# Patient Record
Sex: FEMALE | URBAN - METROPOLITAN AREA
[De-identification: names, ages, dates, MRNs, and addresses within clinical notes are randomized per-mention and may not be internally consistent; named-entity substitution may affect disease eponyms.]

---

## 2018-07-26 ENCOUNTER — RECORDS - HEALTHEAST (OUTPATIENT)
Dept: LAB | Facility: CLINIC | Age: 38
End: 2018-07-26

## 2018-07-26 LAB
ALBUMIN SERPL-MCNC: 3.9 G/DL (ref 3.5–5)
ALP SERPL-CCNC: 80 U/L (ref 45–120)
ALT SERPL W P-5'-P-CCNC: 32 U/L (ref 0–45)
ANION GAP SERPL CALCULATED.3IONS-SCNC: 8 MMOL/L (ref 5–18)
AST SERPL W P-5'-P-CCNC: 30 U/L (ref 0–40)
BILIRUB SERPL-MCNC: 0.3 MG/DL (ref 0–1)
BUN SERPL-MCNC: 11 MG/DL (ref 8–22)
CALCIUM SERPL-MCNC: 9.1 MG/DL (ref 8.5–10.5)
CHLORIDE BLD-SCNC: 103 MMOL/L (ref 98–107)
CO2 SERPL-SCNC: 25 MMOL/L (ref 22–31)
CREAT SERPL-MCNC: 0.64 MG/DL (ref 0.6–1.1)
ERYTHROCYTE [DISTWIDTH] IN BLOOD BY AUTOMATED COUNT: 13.4 % (ref 11–14.5)
GFR SERPL CREATININE-BSD FRML MDRD: >60 ML/MIN/1.73M2
GLUCOSE BLD-MCNC: 72 MG/DL (ref 70–125)
HCT VFR BLD AUTO: 38.8 % (ref 35–47)
HGB BLD-MCNC: 13.4 G/DL (ref 12–16)
MCH RBC QN AUTO: 29.2 PG (ref 27–34)
MCHC RBC AUTO-ENTMCNC: 34.5 G/DL (ref 32–36)
MCV RBC AUTO: 85 FL (ref 80–100)
PLATELET # BLD AUTO: 274 THOU/UL (ref 140–440)
PMV BLD AUTO: 10 FL (ref 8.5–12.5)
POTASSIUM BLD-SCNC: 5 MMOL/L (ref 3.5–5)
PROT SERPL-MCNC: 7.4 G/DL (ref 6–8)
RBC # BLD AUTO: 4.59 MILL/UL (ref 3.8–5.4)
SODIUM SERPL-SCNC: 136 MMOL/L (ref 136–145)
TSH SERPL DL<=0.005 MIU/L-ACNC: 1.39 UIU/ML (ref 0.3–5)
VIT B12 SERPL-MCNC: 1265 PG/ML (ref 213–816)
WBC: 6.5 THOU/UL (ref 4–11)

## 2019-07-16 ENCOUNTER — HOSPITAL ENCOUNTER (OUTPATIENT)
Dept: LAB | Age: 39
Setting detail: SPECIMEN
Discharge: HOME | End: 2019-07-16

## 2019-07-17 LAB
HPV SOURCE: NORMAL
HUMAN PAPILLOMA VIRUS 16 DNA: NEGATIVE
HUMAN PAPILLOMA VIRUS 18 DNA: NEGATIVE
HUMAN PAPILLOMA VIRUS FINAL DIAGNOSIS: NORMAL
HUMAN PAPILLOMA VIRUS OTHER HR: NEGATIVE
SPECIMEN DESCRIPTION: NORMAL

## 2019-07-23 ENCOUNTER — RECORDS - HEALTHEAST (OUTPATIENT)
Dept: ADMINISTRATIVE | Facility: OTHER | Age: 39
End: 2019-07-23

## 2019-07-23 LAB
BKR LAB AP ABNORMAL BLEEDING: NO
BKR LAB AP BIRTH CONTROL/HORMONES: NORMAL
BKR LAB AP CERVICAL APPEARANCE: NORMAL
BKR LAB AP GYN ADEQUACY: NORMAL
BKR LAB AP GYN INTERPRETATION: NORMAL
BKR LAB AP HPV REFLEX: NORMAL
BKR LAB AP LMP: NORMAL
BKR LAB AP PATIENT STATUS: NO
BKR LAB AP PREVIOUS ABNORMAL: NORMAL
BKR LAB AP PREVIOUS NORMAL: 2016
HIGH RISK?: NO
PATH REPORT.COMMENTS IMP SPEC: NORMAL
RESULT FLAG (HE HISTORICAL CONVERSION): NORMAL

## 2020-01-14 ENCOUNTER — RECORDS - HEALTHEAST (OUTPATIENT)
Dept: LAB | Facility: CLINIC | Age: 40
End: 2020-01-14

## 2020-01-14 LAB
CLUE CELLS: NORMAL
TRICHOMONAS, WET PREP: NORMAL
YEAST, WET PREP: NORMAL

## 2020-01-15 LAB
C TRACH DNA SPEC QL PROBE+SIG AMP: NEGATIVE
N GONORRHOEA DNA SPEC QL NAA+PROBE: NEGATIVE

## 2023-01-30 ENCOUNTER — NURSE TRIAGE (OUTPATIENT)
Dept: NURSING | Facility: CLINIC | Age: 43
End: 2023-01-30

## 2023-01-30 ENCOUNTER — OFFICE VISIT (OUTPATIENT)
Dept: URGENT CARE | Facility: URGENT CARE | Age: 43
End: 2023-01-30
Payer: OTHER MISCELLANEOUS

## 2023-01-30 VITALS
SYSTOLIC BLOOD PRESSURE: 117 MMHG | OXYGEN SATURATION: 98 % | HEART RATE: 75 BPM | DIASTOLIC BLOOD PRESSURE: 78 MMHG | RESPIRATION RATE: 16 BRPM

## 2023-01-30 DIAGNOSIS — S39.012A STRAIN OF LUMBAR REGION, INITIAL ENCOUNTER: Primary | ICD-10-CM

## 2023-01-30 DIAGNOSIS — M62.830 BACK MUSCLE SPASM: ICD-10-CM

## 2023-01-30 PROCEDURE — 99203 OFFICE O/P NEW LOW 30 MIN: CPT | Performed by: PHYSICIAN ASSISTANT

## 2023-01-30 RX ORDER — NAPROXEN 500 MG/1
500 TABLET ORAL 2 TIMES DAILY WITH MEALS
Qty: 30 TABLET | Refills: 0 | Status: SHIPPED | OUTPATIENT
Start: 2023-01-30 | End: 2024-01-30

## 2023-01-30 RX ORDER — METHOCARBAMOL 500 MG/1
500 TABLET, FILM COATED ORAL 3 TIMES DAILY
Qty: 30 TABLET | Refills: 0 | Status: SHIPPED | OUTPATIENT
Start: 2023-01-30 | End: 2024-04-23

## 2023-01-30 NOTE — PROGRESS NOTES
Assessment & Plan     Strain of lumbar region, initial encounter    Patient has lower back strain from work comp injury  No xrays indicated  Naprosyn for low back pain and inflammation      - naproxen (NAPROSYN) 500 MG tablet; Take 1 tablet (500 mg) by mouth 2 times daily (with meals)    Back muscle spasm    Patient has tightness in back  Warm moist heat with ROM exercises  Start on robaxin for muscle tension    - methocarbamol (ROBAXIN) 500 MG tablet; Take 1 tablet (500 mg) by mouth 3 times daily    Review of external notes as documented elsewhere in note    At today's visit with Anya Sherman , we discussed results, diagnosis, medications and formulated a plan.  We also discussed red flags for immediate return to clinic/ER, as well as indications for follow up with PCP if not improved in 3 days. Patient understood and agreed to plan. Anya Sherman was discharged with stable vitals and has no further questions.       No follow-ups on file.    Joseph De Souza, [facility], PA-C  [facility] URGENT CARE [facility]    Steph Joyner is a 42 year old, presenting for the following health issues:  Back Injury (Pt hurt back at work today )      HPI   Review of Systems   Constitutional, HEENT, cardiovascular, pulmonary, gi and gu systems are negative, except as otherwise noted.      Objective    /78   Pulse 75   Resp 16   SpO2 98%   There is no height or weight on file to calculate BMI.  Physical Exam   GENERAL: healthy, alert and no distress  RESP: lungs clear to auscultation - no rales, rhonchi or wheezes  CV: regular rate and rhythm, normal S1 S2, no S3 or S4, no murmur, click or rub, no peripheral edema and peripheral pulses strong  ABDOMEN: soft, nontender, no hepatosplenomegaly, no masses and bowel sounds normal  MS: Positive for left side lower back tenderness and muscle tightness of left low back  SKIN: no suspicious lesions or rashes  NEURO: Normal strength and tone, mentation intact and  speech normal  PSYCH: mentation appears normal, affect normal/bright

## 2023-01-30 NOTE — TELEPHONE ENCOUNTER
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  At or about 7 AM began with mid to lower left back pain    Same level as umbilical cord but in back more to left side.  States pain is 9/10 at time of this call.  Hurts when walking or trying to sit down or to get out of sitting position  If siitting down with straight and not moving,  does not hurt    Pt did not apply ice   Has stayed at work all day today  Pt reported this to her employers    According to the protocol, patient should see a physician or HCP today.  Care advice given. Patient verbalizes understanding and agrees with plan of care.     Vanna Prater RN, Nurse Advisor 3:09 PM 1/30/2023  Reason for Disposition    Back pain from overuse (work, exercise, gardening) OR from twisting, lifting, or bending injury    SEVERE back pain (e.g., excruciating, unable to do any normal activities) and not improved after pain medicine and CARE ADVICE    Additional Information    Negative: Dangerous mechanism of injury (e.g., MVA, contact sports, trampoline, diving, fall > 10 feet or 3 meters)  (Exception: Back pain began > 1 hour after injury.)    Negative: Weakness (i.e., paralysis, loss of muscle strength) of the leg(s) or foot and sudden onset after back injury    Negative: Numbness (i.e., loss of sensation) of the leg(s) or foot and sudden onset after back injury    Negative: Major bleeding (actively dripping or spurting) that can't be stopped    Negative: Bullet, knife or other serious penetrating wound    Negative: Shock suspected (e.g., cold/pale/clammy skin, too weak to stand, low BP, rapid pulse)    Negative: Sounds like a life-threatening emergency to the triager    Negative: Back pain not from an injury    Negative: Passed out (i.e., fainted, collapsed and was not responding)    Negative: Shock suspected (e.g., cold/pale/clammy skin, too weak  to stand, low BP, rapid pulse)    Negative: Sounds like a life-threatening emergency to the triager    Negative: Major injury to the back (e.g., MVA, fall > 10 feet or 3 meters, penetrating injury, etc.)    Negative: Pain in the upper back over the ribs (rib cage) that radiates (travels) into the chest    Negative: Pain in the upper back over the ribs (rib cage) and worsened by coughing (or clearly increases with breathing)    Negative: Back pain during pregnancy    Negative: SEVERE back pain of sudden onset and age > 60 years    Negative: SEVERE abdominal pain (e.g., excruciating)    Negative: Abdominal pain and age > 60 years    Negative: Unable to urinate (or only a few drops) and bladder feels very full    Negative: Loss of bladder or bowel control (urine or bowel incontinence; wetting self, leaking stool) of new-onset    Negative: Numbness (loss of sensation) in groin or rectal area    Negative: Pain radiates into groin, scrotum    Negative: Blood in urine (red, pink, or tea-colored)    Negative: Vomiting and pain over lower ribs of back (i.e., flank - kidney area)    Negative: Weakness of a leg or foot (e.g., unable to bear weight, dragging foot)    Negative: Patient sounds very sick or weak to the triager    Negative: Fever > 100.4 F (38.0 C) and flank pain    Negative: Pain or burning with passing urine (urination)    Protocols used: BACK INJURY-A-OH, BACK PAIN-A-OH

## 2023-01-30 NOTE — LETTER
[facility] URGENT CARE [address] [address]  [phone]        2023    REPORT OF WORK ABILITY    PATIENT DATA  Employee Name: Anya Sherman        : 1980   (Not on file)  Work related injury: YES  Today's date: 2023  Date of injury: 2023     PROVIDER EVALUATION: Please fill in as needed.  Please give copy to employee for employer.  1. Diagnosis: Lumbosacral muscle strain  2. Treatment: ice, rest, ROM exercises, medications  3. Medication: naprosyn, robaxin  NOTE: When ordering a medication, [place] Rules require Work Comp or WC on prescriptions.  4. Return to work date: May return tomorrow with restrictions      RESTRICTIONS:   No lifting over 10 lbs, no bending or stooping, no pushing/pulling  Medical Examiner: Joseph De Souza, [facility], PA-C          Next appointment: 1 week.  Follow up with PCP in 1 week for recheck if needing ongoing restrictions    CC: Employer, Managed Care Plan/Payor, Patient

## 2023-02-03 ENCOUNTER — NURSE TRIAGE (OUTPATIENT)
Dept: NURSING | Facility: CLINIC | Age: 43
End: 2023-02-03
Payer: OTHER MISCELLANEOUS

## 2023-02-03 ENCOUNTER — OFFICE VISIT (OUTPATIENT)
Dept: URGENT CARE | Facility: URGENT CARE | Age: 43
End: 2023-02-03
Payer: OTHER MISCELLANEOUS

## 2023-02-03 VITALS
DIASTOLIC BLOOD PRESSURE: 62 MMHG | OXYGEN SATURATION: 100 % | SYSTOLIC BLOOD PRESSURE: 101 MMHG | HEART RATE: 57 BPM | TEMPERATURE: 98.3 F

## 2023-02-03 DIAGNOSIS — T78.40XA ALLERGIC REACTION, INITIAL ENCOUNTER: Primary | ICD-10-CM

## 2023-02-03 DIAGNOSIS — M54.50 BILATERAL LOW BACK PAIN WITHOUT SCIATICA, UNSPECIFIED CHRONICITY: ICD-10-CM

## 2023-02-03 PROCEDURE — 99214 OFFICE O/P EST MOD 30 MIN: CPT | Performed by: PHYSICIAN ASSISTANT

## 2023-02-03 RX ORDER — METHYLPREDNISOLONE 4 MG
TABLET, DOSE PACK ORAL
Qty: 21 TABLET | Refills: 0 | Status: SHIPPED | OUTPATIENT
Start: 2023-02-03 | End: 2024-04-23

## 2023-02-03 RX ORDER — CETIRIZINE HYDROCHLORIDE 10 MG/1
10 TABLET ORAL DAILY
Qty: 14 TABLET | Refills: 0 | Status: SHIPPED | OUTPATIENT
Start: 2023-02-03 | End: 2024-04-23

## 2023-02-03 RX ORDER — ACETAMINOPHEN 500 MG
500-1000 TABLET ORAL EVERY 6 HOURS PRN
Qty: 30 TABLET | Refills: 0 | Status: SHIPPED | OUTPATIENT
Start: 2023-02-03 | End: 2024-04-23

## 2023-02-03 NOTE — PROGRESS NOTES
Assessment & Plan     Allergic reaction, initial encounter    Patient was taking medrol and zyrtec for allergic reaction, hives    Medrol and zyrtec for itching and allergic reaction    - methylPREDNISolone (MEDROL DOSEPAK) 4 MG tablet therapy pack; Follow package instructions  - cetirizine (ZYRTEC) 10 MG tablet; Take 1 tablet (10 mg) by mouth daily    Bilateral low back pain without sciatica, unspecified chronicity    When your back pain is new, you may find that certain positions will ease your pain. Gently try each of the following positions until you find one that eases your pain. Once you find a position of comfort, use it as often as you like while you recover. Return to your daily routine as soon as possible.  Use walking to help relieve your discomfort. Try taking a short walk every 3 to 4 hours during the day. Walk for a few minutes inside your home or take longer walks outside, on a treadmill or at a mall. Slowly increase the amount of time you walk. Expect discomfort when you begin, but it should lessen as your back starts to recover.  Your back pain should improve over the first couple of weeks. As it improves, you should be able to return to your normal activities.    - methylPREDNISolone (MEDROL DOSEPAK) 4 MG tablet therapy pack; Follow package instructions  - acetaminophen (TYLENOL) 500 MG tablet; Take 1-2 tablets (500-1,000 mg) by mouth every 6 hours as needed for mild pain    Review of external notes as documented elsewhere in note       At today's visit with Anya Burnettezquez , we discussed results, diagnosis, medications and formulated a plan.  We also discussed red flags for immediate return to clinic/ER, as well as indications for follow up with PCP if not improved in 3 days. Patient understood and agreed to plan. Anya Sherman was discharged with stable vitals and has no further questions.       No follow-ups on file.    Joseph De Souza, [facility], PA-BRAULIO  [facility] URGENT CARE  MARY Joyner is a 42 year old, presenting for the following health issues:  Medication Reaction (Started yesterday. Started on her hands moved up the arms, on the feet and on the legs. She woke up this morning and it was on her face. It is very itchy. Clothes irritate it. Lines of light in her eye sight yesterday. Started taking Methocarbamol 500mg and Naproxen 500mg.  )      HPI   Review of Systems   Constitutional, HEENT, cardiovascular, pulmonary, gi and gu systems are negative, except as otherwise noted.      Objective    /62   Pulse 57   Temp 98.3  F (36.8  C) (Oral)   SpO2 100%   There is no height or weight on file to calculate BMI.  Physical Exam   GENERAL: healthy, alert and no distress  ABDOMEN: soft, nontender, no hepatosplenomegaly, no masses and bowel sounds normal  MS: Positive for lower back tenderness, tightness in lumbar  SKIN: generalized hives, itching  NEURO: Normal strength and tone, mentation intact and speech normal  PSYCH: mentation appears normal, affect normal/bright

## 2023-02-03 NOTE — TELEPHONE ENCOUNTER
The patient is complaining of rash on arm and chest that started one day ago On Thursday 02/02/23    Today the rash has spread to face, legs, and feet on 02/03/23    The patient complains of mild itching, but says she is not scratching    She thinks the rash is related to a new medication she started on Tuesday on  02/01/23    The patient does not have a primary care provider so she was advised to go to urgent care for an evaluation    Caller verbalized and understands directives         Additional Information    Negative: Difficulty breathing or wheezing    Negative: Hoarseness or cough that started soon after 1st dose of drug    Negative: Swollen tongue that started soon after first dose of drug    Negative: Fever and purple or blood-colored spots or dots    Negative: Too weak or sick to stand    Negative: Sounds like a life-threatening emergency to the triager    Negative: Rash is only on 1 part of the body (localized)    Negative: Taking new non-prescription (OTC) antihistamine, decongestant, ear drops, eye drops, or other OTC cough/cold medicine    Negative: Taking new prescription antihistamine, allergy medicine, asthma medicine, eye drops, ear drops or nose drops    Negative: Rash started more than 3 days after stopping new prescription medicine    Negative: Swollen tongue    Negative: Widespread hives and onset < 2 hours of exposure to 1st dose of drug    Negative: Patient sounds very sick or weak to the triager    Negative: Fever    Negative: Face or lip swelling    Negative: Purple or blood-colored spots or dots (no fever and sounds well to triager)    Negative: Joint pain or swelling    Negative: Bloody crusts on lips or in mouth    Negative: Large or small blisters on skin (i.e., fluid filled bubbles or sacs)    Negative: Pregnant    Negative: Rash beginning within 4 hours of a new prescription medication    Negative: Hives or itching    Negative: Patient wants to be seen    Negative: Taking new  prescription antibiotic (EXCEPTION: finished taking new prescription antibiotic)    Taking new prescription medicine (EXCEPTIONs: finished taking new prescription antibiotic OR questions about flushing from niacin)    Protocols used: RASH - WIDESPREAD ON DRUGS - DRUG [id number]

## 2024-04-23 ENCOUNTER — ANCILLARY PROCEDURE (OUTPATIENT)
Dept: GENERAL RADIOLOGY | Facility: CLINIC | Age: 44
End: 2024-04-23
Attending: INTERNAL MEDICINE
Payer: OTHER MISCELLANEOUS

## 2024-04-23 ENCOUNTER — OFFICE VISIT (OUTPATIENT)
Dept: FAMILY MEDICINE | Facility: CLINIC | Age: 44
End: 2024-04-23
Payer: OTHER MISCELLANEOUS

## 2024-04-23 VITALS
BODY MASS INDEX: 27.4 KG/M2 | DIASTOLIC BLOOD PRESSURE: 65 MMHG | HEART RATE: 70 BPM | HEIGHT: 59 IN | TEMPERATURE: 98.3 F | RESPIRATION RATE: 19 BRPM | SYSTOLIC BLOOD PRESSURE: 112 MMHG | WEIGHT: 135.9 LBS | OXYGEN SATURATION: 99 %

## 2024-04-23 DIAGNOSIS — G89.29 CHRONIC PAIN OF RIGHT ELBOW: Primary | ICD-10-CM

## 2024-04-23 DIAGNOSIS — M25.521 CHRONIC PAIN OF RIGHT ELBOW: Primary | ICD-10-CM

## 2024-04-23 DIAGNOSIS — M25.521 CHRONIC PAIN OF RIGHT ELBOW: ICD-10-CM

## 2024-04-23 DIAGNOSIS — G89.29 CHRONIC PAIN OF RIGHT ELBOW: ICD-10-CM

## 2024-04-23 DIAGNOSIS — M75.41 IMPINGEMENT SYNDROME, SHOULDER, RIGHT: ICD-10-CM

## 2024-04-23 PROCEDURE — 73070 X-RAY EXAM OF ELBOW: CPT | Mod: TC | Performed by: RADIOLOGY

## 2024-04-23 PROCEDURE — 99213 OFFICE O/P EST LOW 20 MIN: CPT | Performed by: INTERNAL MEDICINE

## 2024-04-23 ASSESSMENT — PAIN SCALES - GENERAL: PAINLEVEL: EXTREME PAIN (9)

## 2024-04-23 NOTE — LETTER
April 23, 2024      Anya Sherman  [address]  [facility] [address]        To Whom It May Concern:    Anya Sherman was seen in our clinic.  She was found to have fall related injury of right elbow and right shoulder that occurred around January 25, 2024.  She has been found to have medial epicondylitis of the elbow and right shoulder impingement syndrome.  She will be starting physical therapy.  Her work should be restricted to intermittent lifting to no more than 10 pounds.      Sincerely,        Gold Bush MD

## 2024-04-23 NOTE — COMMUNITY RESOURCES LIST (PATIENT PREFERRED LANGUAGE)
April 23, 2024           TU LISTA PERSONALIZADA DE SERVICIOS y PROGRAMAS           ÓN DE BENEFICIOS    ón de elegibilidad para beneficios      [facility] application assistance  [address] [place] [address] (Distancia: 2.1 millas)  Teléfono: [phone]  Idioma: Melanie Niocle: Andria      [facility] [place] [address] (Distancia: 2.1 millas)  Idioma: Melanie Nicole: Andria Talavera - Navigators  Teléfono: [phone]  Sitio web: https://www.[facility].org/about-us/assister-program/navigators/index.jsp  Idioma: Melanie Moreno: Clau 8:00 a. m. - 4:00 p. m. Mar 8:00 a. m. - 4:00 p. m. Mié 8:00 a. m. - 4:00 p. m. Jue 8:00 a. m. - 4:00 p. m.        ALIMENTARIA    beneficios nutricionales      [facility] [place] [address] (Distancia: 2.1 millas)  Idioma: Melanie Nicole: Andria      [facility] [place] [address] (Distancia: 2.1 millas)  Idioma: Melanie Nicole: Andria      [employer] [place] - SNAP (formerly food stamps) Screening and Application help  Teléfono: [phone]  Sitio web: https://www.TriptrottingCloud Nine Productionsions.org/programs/[place]-food-helpline/  Idioma: Melanie Moreno: Clau 10:00 a. m. - 5:00 p. m. Mar 10:00 a. m. - 5:00 p. m. Mié 10:00 a. m. - 5:00 p. m. Jue 10:00 a. m. - 5:00 p. m. Vie 10:00 a. m. - 5:00 p. m.  Erasmo Ayers  Accesibilidad: Ada accesible, Alojamiento para personas ciegas, Sordos o con problemas de audición, Servicios de traducción    de alimentos      [facility] - Food pantry  [address] [place] [address] (Distancia: 3.7 millas)  Teléfono: [phone]  Sitio web: http://www.saintolaf.org/  Idioma: Melanie Nicole: Andria   Accesibilidad: Ada accesible      Food Shelf and [employer]rift [employer] - Food pantry  [address] [place] [address] (Distancia: 2.9 millas)  Teléfono: [phone]  Sitio web: http://www.[facility].org/  Idioma: Eitan Newsome  Tarifa: [place]  Accesibilidad: Ada accesible      [employer] Food Shelf - [place] [employer] Food Shelf  Teléfono: [phone]  Sitio web: www.bountifulbasket[facility].org  Idioma: Eitan Newsome  Horas: Clau 9:00 a. m. - 3:30 p. m. Mar 9:00 a. m. - 6:30 p. m. Mié 9:00 a. m. - 3:30 p. m. Jue 9:00 a. m. - 12:30 p. m. Vie 9:00 a. m. - 12:30 p. m. Sáb 9:00 a.m. - 12:00 p.m.  Tarifa: [place]        Y DEDE    ón de casos de vivienda      Living - Housing Support-[facility] (HWS-I)  [address] [place] [address] (Distancia: 4.3 millas)  Teléfono: [phone]  Sitio web: https://www.[employer]era Networks  Idioma: Melanie Williamson Somalí  Tarprema: Radhauro      [facility] - Housing With Services Independent  [address] [place] [address] (Distancia: 1.9 millas)  Teléfono: [phone]  Sitio web: https://www.[facility]ck[facility]ipp[facility].net/contact  Idioma: Eitan Newsome  Accesibilidad: Ada accesible, Alojamiento para personas ciegas, Sordos o con problemas de audición, Servicios de traducción      [employer]ousing [employer] - Housing Stabilization Services  Teléfono: [phone]  Sitio web: https://"[employer] Networks/  Idioma: Inglés  Horas: Clau 8:00 a. m. - 4:00 p. m. Mar 8:00 a. m. - 4:00 p. m. Mié 8:00 a. m. - 4:00 p. m. Jue 8:00 a. m. - 4:00 p. m. Vie 8:00 a. m. - 4:00 p. m.  Tarifa: [place]  Accesibilidad: Alojamiento para ciegos, sordos o con problemas de audición  Opciones de transporte: Transporte gratuito    para el pago de alquiler/hipoteca      MPLS - Emergency Rental Assistance (ERA)  [address] [place] [address] (Distancia: 4.0 millas)  Teléfono: [phone]  Correo electrónico: [email or web address]  Sitio web: https://www.[facility] Center[facility].org/erlinda  Idioma:  Melanie Nicole: Andria [facility] Assistance  [address] [place] [address] (Distancia: 3.4 millas)  Idioma: Melanie Nicole: Andria  Accesibilidad: Sordo o con problemas de audición, Ada accesible      30-Days Foundation  Keep the Key  Teléfono: [phone]  Sitio web: https://www.wis36-gvzxjwrhikzinp.org/programs.html  Idioma: Melanie  Horas: Clau 7:00 a. m. - 7:00 p. m. Mar 7:00 a. m. - 7:00 p. m. Mié 7:00 a. m. - 7:00 p. m. Jue 7:00 a. m. - 7:00 p. m. Vie 7:00 a. m. - 7:00 p. m.  Tarifa: Andria gallardo de Vivienda y Prevención de Desalojos      Living - Housing Support-[facility] (HWS-I)  [address] [place] [address] (Distancia: 4.3 millas)  Teléfono: [phone]  Sitio web: https://www.[employer]  Idioma: Melanie Williamson Somalí Tarifa: Seguro      Resource Connections - [place] [address] (Distancia: 3.7 millas)  Teléfono: [phone]  Sitio web: https://May[employer] PAC/housingcourtinfo  Idioma: Melanie Nicole: Andria      Line - Tenant Rights / Eviction Prevention  Sitio web: https://GogoCoin.org/l-olbq-wz-/  Idioma: Eitan Newsome               NÚMEROS Y SITIOS WEB IMPORTANTES        Servicios de emergencia  911  .   La vía unida  211 http://211unitedway.org  .   Control de veneno  (642) 155-5977 http://mnpoison.org http://wisconsinpoison.org  .     Salvavidas para el suicidio y las crisis  988http://988lifeline.org  .   Línea directa nacional de abuso infantil Childhelp  660.893.3117 http://Childhelphotline.org   .   Línea directa nacional de agresión sexual  (848) 258-3890 (MACARIO) http://Rainn.org   .     Línea Nacional de Seguridad para Fugitivos  ([phone] 867-8640 (FUGA) http://[facility].org  .   Apoyo mehdi el embarazo y el posparto  Llame o envíe un mensaje de texto al [phone] [place]: http://ppsupportmn.org [place]: http://OneTrueFan.com/wi  .   Línea de ayuda nacional para el abuso de sustancias  ([facility])  800-622-HELP (3021) http://Findtreatment.gov   .                DESCARGO DE RESPONSABILIDAD: Estos recursos se rincon generado a través de la plataforma [employer]. [employer] no respalda a ningún proveedor de servicios mencionado en esta lista de recursos. [employer] no garantiza que los servicios mencionados en esta lista de recursos estén disponibles para usted o mejoren osborn sandra o bienestar.    [facility]

## 2024-04-23 NOTE — PATIENT INSTRUCTIONS
"Patient Education   Tennis Elbow: Care Instructions  Overview     Tennis elbow is soreness or pain on the outer part of the elbow. The pain occurs when the tendon is stretched and becomes irritated by repeated twisting of the hand, wrist, and forearm. A tendon is a tough tissue that connects muscle to bone. This injury is common in tennis players. But you also can get it from many activities that work the same muscles. Examples include gardening, painting, and using tools.  Tennis elbow usually heals with rest and treatment at home.  Follow-up care is a key part of your treatment and safety. Be sure to make and go to all appointments, and call your doctor if you are having problems. It's also a good idea to know your test results and keep a list of the medicines you take.  How can you care for yourself at home?    Rest your fingers, wrist, and forearm. Try to stop or reduce any activity that causes elbow pain. You may have to rest your arm for weeks to months. Follow your doctor's directions for how long to rest.     Put ice or a cold pack on your elbow for 10 to 20 minutes at a time. Try to do this every 1 to 2 hours for the next 3 days (when you are awake) or until the swelling goes down. Put a thin cloth between the ice and your skin. You can try heat, or alternating heat and ice, after the first 3 days.     If your doctor gave you a brace or splint, use it as directed. A \"counterforce\" brace is a strap around your forearm, just below your elbow. It may ease the pressure on the tendon and spread force throughout your arm.     Prop up your elbow on pillows to help reduce swelling.     Follow your doctor's or physical therapist's directions for exercise.     Return to your usual activities slowly.     Try to prevent the problem. Learn the best techniques for your sport. For example, make sure the  on your tennis racquet is not too big for your hand. Try not to hit a tennis ball late in your swing.     If you " "work, consider asking your employer about new ways of doing your job if your elbow pain is caused by something you do at work.   Medicines    Be safe with medicines. Read and follow all instructions on the label.  If the doctor gave you a prescription medicine for pain, take it as prescribed.  If you are not taking a prescription pain medicine, ask your doctor if you can take an over-the-counter medicine.   When should you call for help?   Call your doctor now or seek immediate medical care if:    Your pain is worse.     You cannot bend your elbow normally.     Your arm or hand is cool or pale or changes color.     You have tingling, weakness, or numbness in your hand and fingers.   Watch closely for changes in your health, and be sure to contact your doctor if:    You have work problems caused by your elbow pain.     Your pain is not better after 2 weeks.   Where can you learn more?  Go to https://www.PeptiVir.net/patiented  Enter C285 in the search box to learn more about \"Tennis Elbow: Care Instructions.\"  Current as of: July 17, 2023               Content Version: 14.0    4540-5299 [employer].   Care instructions adapted under license by your healthcare professional. If you have questions about a medical condition or this instruction, always ask your healthcare professional. [employer] disclaims any warranty or liability for your use of this information.         "

## 2024-04-23 NOTE — PROGRESS NOTES
"  Assessment & Plan     1.  Chronic pain of the right elbow since work related injury around 1/25/2024.  X-ray of the right elbow negative today.  Clinically appears to have medial epicondylitis.  Advised nonsteroidal anti-inflammatory medication.  Diclofenac 50 mg twice a day prescribed and patient referred to physical therapy.  If she does not show improvement in the next 4 to 6 weeks we will refer her to sports medicine.  Restrict weight lifting to less than 10 pounds and that to intermittent.  2.  Impingement syndrome right shoulder suspected based on clinical exam.  Advised physical therapy for it as well.  Physical therapy referral placed.    BMI  Estimated body mass index is 27.92 kg/m  as calculated from the following:    Height as of this encounter: 1.486 m (4' 10.5\").    Weight as of this encounter: 61.6 kg (135 lb 14.4 oz).         Steph Joyner is a 43 year old, presenting for the following health issues:  Establish Care and Arm Pain (Ongoing right arm pain, due to a fall. )        4/23/2024    12:56 PM   Additional Questions   Roomed by Cyndie   Accompanied by son     History of Present Illness       Reason for visit:  Right arm inury  Symptom onset:  More than a month  Symptoms include:  Pain  Symptom intensity:  Moderate  Symptom progression:  Worsening  Had these symptoms before:  No  What makes it worse:  Lifting heavy objects  What makes it better:  Nothing    She eats 4 or more servings of fruits and vegetables daily.She consumes 0 sweetened beverage(s) daily.She exercises with enough effort to increase her heart rate 9 or less minutes per day.  She exercises with enough effort to increase her heart rate 3 or less days per week.   She is taking medications regularly.       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  She landed on her left knee and right outstretched hand.  She thereafter felt pain in the right elbow and also appears the pain is around the right " "shoulder as well.  The fall occurred around January 25, 2024.  Her symptoms have not improved and in fact they have gotten little worse.  The pain in the elbow is worst when she does lifting.  Her work requires lifting supplies and wet towels.  Intermittently she has to lift up to 20 pounds or more.  The right arm she feels feels little weak.  She has noticed that when she tries to reach back or raise her arm straight up that is when she has most pain.  Laying on the right side also hurts the shoulder.    The patient is accompanied by her son today.      Review of Systems  Constitutional, HEENT, cardiovascular, pulmonary, GI, , musculoskeletal, neuro, skin, endocrine and psych systems are negative, except as otherwise noted.      Objective    /65 (BP Location: Right arm, Patient Position: Sitting, Cuff Size: Adult Regular)   Pulse 70   Temp 98.3  F (36.8  C) (Temporal)   Resp 19   Ht 1.486 m (4' 10.5\")   Wt 61.6 kg (135 lb 14.4 oz)   LMP 03/13/2024 (Exact Date)   SpO2 99%   BMI 27.92 kg/m    Body mass index is 27.92 kg/m .  Physical Exam   GENERAL: alert and no distress  MS: Right upper extremity examination reveals no swelling.  Right elbow examination reveals good range of motion.  Flexion extension is normal.  There is localized tenderness over medial epicondyle.  Right shoulder examination reveals no swelling or redness.  Full abduction is limited by about 50 to 20 degrees because of pain.  Internal and external rotation also associate with some pain.    Xray - Reviewed and interpreted by me.  X-ray right elbow performed which looks normal.  I reviewed it with the patient.        Signed Electronically by: Gold Bush MD    "

## 2024-04-23 NOTE — COMMUNITY RESOURCES LIST (ENGLISH)
April 23, 2024           YOUR PERSONALIZED LIST OF SERVICES & PROGRAMS           NAVIGATION    Eligibility Screening      County - Disability application assistance  [address] [place] [address] (Distance: 2.1 miles)  Phone: [phone]  Language: English  Fee: Free      [facility] [place] [facility] - [facility]  [address] [place] [address] (Distance: 2.1 miles)  Language: English  Fee: Free      [employer] - Navigators  Phone: (521) [phone]  Website: https://www.mnsure.org/about-us/assister-program/navigators/index.jsp  Language: English  Hours: Mon 8:00 AM - 4:00 PM Tue 8:00 AM - 4:00 PM Wed 8:00 AM - 4:00 PM Thu 8:00 AM - 4:00 PM        ASSISTANCE    Nutrition Benefits      [facility] - [place] application assistance - [facility]  [address] [place] [address] (Distance: 2.1 miles)  Language: English  Fee: Free      [facility] - [place] application assistance - [facility]  [address] [place] [address] (Distance: 2.1 miles)  Language: English  Fee: Free      [employer] [place] - SNAP (formerly food stamps) Screening and Application help  Phone: (818) 162-9229  Website: https://www.hungersolutions.org/programs/[place]-food-helpline/  Language: English  Hours: Mon 10:00 AM - 5:00 PM Tue 10:00 AM - 5:00 PM Wed 10:00 AM - 5:00 PM Thu 10:00 AM - 5:00 PM Fri 10:00 AM - 5:00 PM  Fee: Free  Accessibility: [place] accessible, Blind accommodation, Deaf or hard of hearing, Translation services    Pantry      [facility] - Food pantry  [address] [place] [address] (Distance: 3.7 miles)  Phone: [phone]) [phone]  Website: http://www.saintolaf.org/  Language: English  Fee: Free  Accessibility: Ada accessible      [employer] - Food pantry  [address] [place] [address] (Distance: 2.9 miles)  Phone:  (335) [phone]  Website: http://www.[employer] Chant[employer]aceshLockr.org/  Language: English, Vietnamese  Fee: Free  Accessibility: Ada accessible      [employer] - [place] [employer] Shelf  Phone: (447) 992-9121  Website: www.[facility].[employer]  Language: English, Vietnamese  Hours: Mon 9:00 AM - 3:30 PM Tue 9:00 AM - 6:30 PM Wed 9:00 AM - 3:30 PM Thu 9:00 AM - 12:30 PM Fri 9:00 AM - 12:30 PM Sat 9:00 AM - 12:00 PM  Fee: Free        & SHELTER    Case Management      Living - Housing Support-Health system (HWS-I)  [address] [place] [address] (Distance: 4.3 miles)  Phone: [phone]) [phone]  Website: https://wwwDrexel Metals  Language: Icelandic, English, Turks and Caicos Islander  Fee: Insurance      [facility] [place] - Housing With Services Independent  [address] [place] [address] (Distance: 1.9 miles)  Phone: (536) 838-1576  Website: https://www.[employer]/contact  Language: English, Vietnamese  Accessibility: Ada accessible, Blind accommodation, Deaf or hard of hearing, Translation services      [employer] - Housing Stabilization Services  Phone: (359) 940-7688  Website: https://homebasemn.com/  Language: English  Hours: Mon 8:00 AM - 4:00 PM Tue 8:00 AM - 4:00 PM Wed 8:00 AM - 4:00 PM Thu 8:00 AM - 4:00 PM Fri 8:00 AM - 4:00 PM  Fee: Free  Accessibility: Blind accommodation, Deaf or hard of hearing  Transportation Options: Free transportation    Payment Assistance      [employer] - Emergency Rental Assistance (ERA)  [address] [place] [address] (Distance: 4.0 miles)  Phone: (386) 582-4106  Email: sana@[employer]  Website: https://www.[employer].org/era  Language: English  Fee: Free      [facility] - Rent Assistance  [address] [place] [address] (Distance: 3.4 miles)  Language: English  Fee: Free  Accessibility: Deaf or hard of hearing, Ada accessible      [employer] - [employer] Key  Phone: (795) 397-3955  Website:  https://www.emc09-kbvnmejvinfieo.org/programs.html  Language: English  Hours: Mon 7:00 AM - 7:00 PM Tue 7:00 AM - 7:00 PM Wed 7:00 AM - 7:00 PM Thu 7:00 AM - 7:00 PM Fri 7:00 AM - 7:00 PM  Fee: Free    Mediation & Eviction Prevention      Living - Housing Support-[facility] (HWS-I)  [address] [place] [address] (Distance: 4.3 miles)  Phone: (173) 536-6077  Website: https://[employer]  Language: Faroese, English, Trinidadian  Fee: Insurance      [employer] - Tenant [place] [address] (Distance: 3.7 miles)  Phone: (668) 830-7635  Website: https://Nutraspace/housingcourtinfo  Language: English  Fee: Free      Line - Tenant Rights / Eviction Prevention  Website: https://[facility].org/o-zonm-mi-/  Language: English, Montenegrin               IMPORTANT NUMBERS & WEBSITES        Emergency Services  911  .   United Way  211 http://211unitedway.org  .   Poison Control  (833) 904-4422 http://[place]poison.org http://wisconsinpoison.org  .     Suicide and Crisis Lifeline  988 http://988lifeline.org  .   Childhelp [place] Child Abuse Hotline  531.665.9145 http://Childhelphotline.org   .   National Sexual Assault Hotline  (222) 160-9764 (HOPE) http://Rainn.org   .     National Runaway Safeline  (433) 320-9628 (RUNAWAY) http://1800runaPhotoFix UK.org  .   Pregnancy & Postpartum Support  Call/text 896-071-9705  [place]: http://ppsupportmn.org  [place]: http://Silver[employer].com/wi  .   Substance Abuse National Helpline ([facility])  569-169-HELP (3262) http://Findtreatment.gov   .                DISCLAIMER: These resources have been generated via the [employer] Platform. [employer] does not endorse any service providers mentioned in this resource list. [employer] does not guarantee that the services mentioned in this resource list will be available to you or will improve your health or wellness.    [facility]

## 2024-04-29 ENCOUNTER — THERAPY VISIT (OUTPATIENT)
Dept: PHYSICAL THERAPY | Facility: CLINIC | Age: 44
End: 2024-04-29
Attending: INTERNAL MEDICINE
Payer: OTHER MISCELLANEOUS

## 2024-04-29 DIAGNOSIS — M25.521 CHRONIC PAIN OF RIGHT ELBOW: ICD-10-CM

## 2024-04-29 DIAGNOSIS — G89.29 CHRONIC PAIN OF RIGHT ELBOW: ICD-10-CM

## 2024-04-29 DIAGNOSIS — M75.41 IMPINGEMENT SYNDROME, SHOULDER, RIGHT: ICD-10-CM

## 2024-04-29 DIAGNOSIS — M25.521 RIGHT ELBOW PAIN: ICD-10-CM

## 2024-04-29 DIAGNOSIS — M25.511 SHOULDER PAIN, RIGHT: Primary | ICD-10-CM

## 2024-04-29 PROCEDURE — 97161 PT EVAL LOW COMPLEX 20 MIN: CPT | Mod: GP | Performed by: PHYSICAL THERAPIST

## 2024-04-29 PROCEDURE — 97110 THERAPEUTIC EXERCISES: CPT | Mod: GP | Performed by: PHYSICAL THERAPIST

## 2024-04-29 NOTE — PROGRESS NOTES
PHYSICAL THERAPY EVALUATION  Type of Visit: Evaluation  Patient presents to PT for treatment of right elbow and shoulder pain.  She reports a fall at work on 1/25/2024 with mild right elbow pain after.  Elbow pain has gradually worsened since, with lifting and cleaning tasks at work.  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  Right shoulder pain began a few weeks after elbow.  Patient complains of right shoulder and proximal UE pain which is worse with reaching overhead and behind back.  See electronic medical record for Abuse and Falls Screening details.    Subjective       Presenting condition or subjective complaint:    Date of onset:      Relevant medical history:     Dates & types of surgery:      Prior diagnostic imaging/testing results:       Prior therapy history for the same diagnosis, illness or injury:        Prior Level of Function  Transfers:   Ambulation:   ADL:   IADL:     Living Environment  Social support:     Type of home:     Stairs to enter the home:         Ramp:     Stairs inside the home:         Help at home:    Equipment owned:       Employment:      Hobbies/Interests:      Patient goals for therapy:      Pain assessment:      Objective   SHOULDER EVALUATION  PAIN: Pain Level with Use: 7/10  INTEGUMENTARY (edema, incisions): WNL  POSTURE:   GAIT:   Weightbearing Status:   Assistive Device(s):   Gait Deviations:   BALANCE/PROPRIOCEPTION:   WEIGHTBEARING ALIGNMENT: WNL  ROM:  WNL except reaching behind back is limited to posterior hip on right vs T5 on left    STRENGTH:   Pain: - none + mild ++ moderate +++ severe  Strength Scale: 0-5/5 Left Right   Shoulder Flexion 5 5   Shoulder Extension 5 5   Shoulder Abduction 5 4-  ++   Shoulder Adduction 5 5   Shoulder Internal Rotation 5 5   Shoulder External Rotation 5 4+   Shoulder Horizontal Abduction     Shoulder Horizontal Adduction     Elbow Flexion     Elbow Extension     Mid Trap      Lower Trap     Rhomboid     Serratus Anterior       FLEXIBILITY:   SPECIAL TESTS:    Left Right   Impingement     Neer's     Hawkin's-Shreyas  Positive   Coracoid Impingement     Internal impingement     Labral     Anterior Slide     [place]'s     Crank     Instability     Apprehension (anterior)     Relocation (anterior)     Anterior Load & Shift     Posterior Load & Shift     Posterior instability (with 90 degrees flex)     Multi-Directional Instability      Sulcus     Biceps      Speed's     Rotator Cuff Tear     Drop Arm     Belly Press     Lift off        PALPATION: WNL  JOINT MOBILITY: WNL  CERVICAL SCREEN: WNL      PAIN: Pain Location: elbow lateral and medial  INTEGUMENTARY (edema, incisions): WNL  POSTURE:   ROM:     STRENGTH:   Pain: - none + mild ++ moderate +++ severe  Strength Scale: 0-5/5 Left Right   Elbow Flexion 5 5  ++   Elbow Extension 5 4+ ++   Wrist Flexion     Wrist Extension     Supination 5 5   Pronation 5 5  +   Ulnar Deviation     Radial Deviation      Strength (lbs)     Lateral Pinch (lbs)     3 Point Pinch (lbs)          Hand Dominance: Right  FLEXIBILITY: WNL  SPECIAL TESTS:   PALPATION:  tender lateral and medial epicondyl  JOINT MOBILITY:   CERVICAL SCREEN:   THORACIC SCREEN:   SHOULDER SCREEN:     Assessment & Plan   CLINICAL IMPRESSIONS  Medical Diagnosis: right elbow pain, right shoulder impingement    Treatment Diagnosis:     Impression/Assessment: Patient is a 43 year old female with right elbow and shoulder complaints.  The following significant findings have been identified: Pain, Decreased ROM/flexibility, Decreased strength, and Decreased activity tolerance. These impairments interfere with their ability to perform self care tasks, work tasks, recreational activities, and household chores as compared to previous level of function.     Clinical Decision Making (Complexity):  Clinical Presentation: Stable/Uncomplicated  Clinical Presentation Rationale: based on medical  and personal factors listed in PT evaluation  Clinical Decision Making (Complexity): Low complexity    PLAN OF CARE  Treatment Interventions:  Interventions: Manual Therapy, Neuromuscular Re-education, Therapeutic Activity, Therapeutic Exercise    Long Term Goals     PT Goal 1  Goal Identifier: reaching  Goal Description: pt will be able to reach out to side and behind back without pain  Rationale: to maximize safety and independence with performance of ADLs and functional tasks  Goal Progress: reaching behind back limited to posterior hip and reaching out to side causes 6/20 right shoulder pain  Target Date: 06/24/24  PT Goal 2  Goal Identifier: lifting  Goal Description: lift grocery back without right elbow pain  Rationale: to maximize safety and independence with performance of ADLs and functional tasks  Goal Progress: right elbow pain 7/10 with lifging groceries  Target Date: 06/24/24      Frequency of Treatment: 1x/week  Duration of Treatment:      Recommended Referrals to Other Professionals:   Education Assessment:   Learner/Method: Patient;Family;Listening;Reading;Demonstration;Pictures/Video;No Barriers to Learning    Risks and benefits of evaluation/treatment have been explained.   Patient/Family/caregiver agrees with Plan of Care.     Evaluation Time:     PT Eval, Low Complexity Minutes (66344): 20       Signing Clinician: Carie Michaud PT

## 2024-05-10 ENCOUNTER — THERAPY VISIT (OUTPATIENT)
Dept: PHYSICAL THERAPY | Facility: CLINIC | Age: 44
End: 2024-05-10
Payer: OTHER MISCELLANEOUS

## 2024-05-10 DIAGNOSIS — M25.521 RIGHT ELBOW PAIN: Primary | ICD-10-CM

## 2024-05-10 PROCEDURE — 97110 THERAPEUTIC EXERCISES: CPT | Mod: GP | Performed by: PHYSICAL THERAPIST

## 2024-05-10 PROCEDURE — 97140 MANUAL THERAPY 1/> REGIONS: CPT | Mod: GP | Performed by: PHYSICAL THERAPIST

## 2024-05-20 ENCOUNTER — THERAPY VISIT (OUTPATIENT)
Dept: PHYSICAL THERAPY | Facility: CLINIC | Age: 44
End: 2024-05-20
Payer: OTHER MISCELLANEOUS

## 2024-05-20 DIAGNOSIS — M25.521 RIGHT ELBOW PAIN: ICD-10-CM

## 2024-05-20 DIAGNOSIS — M25.511 SHOULDER PAIN, RIGHT: Primary | ICD-10-CM

## 2024-05-20 PROCEDURE — 97035 APP MDLTY 1+ULTRASOUND EA 15: CPT | Mod: GP | Performed by: PHYSICAL THERAPIST

## 2024-05-20 PROCEDURE — 97140 MANUAL THERAPY 1/> REGIONS: CPT | Mod: GP | Performed by: PHYSICAL THERAPIST

## 2024-05-20 PROCEDURE — 97110 THERAPEUTIC EXERCISES: CPT | Mod: GP | Performed by: PHYSICAL THERAPIST

## 2024-06-17 ENCOUNTER — THERAPY VISIT (OUTPATIENT)
Dept: PHYSICAL THERAPY | Facility: CLINIC | Age: 44
End: 2024-06-17
Payer: OTHER MISCELLANEOUS

## 2024-06-17 DIAGNOSIS — M25.511 SHOULDER PAIN, RIGHT: Primary | ICD-10-CM

## 2024-06-17 DIAGNOSIS — M25.521 RIGHT ELBOW PAIN: ICD-10-CM

## 2024-06-17 PROCEDURE — T1013 SIGN LANG/ORAL INTERPRETER: HCPCS | Mod: U4 | Performed by: PHYSICAL THERAPIST

## 2024-06-17 PROCEDURE — 97140 MANUAL THERAPY 1/> REGIONS: CPT | Mod: GP | Performed by: PHYSICAL THERAPIST

## 2024-06-17 PROCEDURE — 97110 THERAPEUTIC EXERCISES: CPT | Mod: GP | Performed by: PHYSICAL THERAPIST

## 2024-06-17 PROCEDURE — 97035 APP MDLTY 1+ULTRASOUND EA 15: CPT | Mod: GP | Performed by: PHYSICAL THERAPIST

## 2024-06-24 ENCOUNTER — APPOINTMENT (OUTPATIENT)
Dept: INTERPRETER SERVICES | Facility: CLINIC | Age: 44
End: 2024-06-24
Payer: OTHER MISCELLANEOUS

## 2024-07-09 ENCOUNTER — THERAPY VISIT (OUTPATIENT)
Dept: PHYSICAL THERAPY | Facility: CLINIC | Age: 44
End: 2024-07-09
Payer: OTHER MISCELLANEOUS

## 2024-07-09 DIAGNOSIS — M25.511 SHOULDER PAIN, RIGHT: Primary | ICD-10-CM

## 2024-07-09 DIAGNOSIS — M25.521 RIGHT ELBOW PAIN: ICD-10-CM

## 2024-07-09 PROCEDURE — 97110 THERAPEUTIC EXERCISES: CPT | Mod: GP

## 2024-07-09 PROCEDURE — T1013 SIGN LANG/ORAL INTERPRETER: HCPCS | Mod: U4

## 2024-07-09 PROCEDURE — 97530 THERAPEUTIC ACTIVITIES: CPT | Mod: GP

## 2024-07-09 PROCEDURE — 97140 MANUAL THERAPY 1/> REGIONS: CPT | Mod: GP

## 2024-07-23 NOTE — PROGRESS NOTES
CHIEF COMPLAINT:  Pain of the Right Elbow     HISTORY OF PRESENT ILLNESS  Ms. Shay Sherman is a pleasant 44 year old year old female who presents to clinic today with right elbow pain.  Anya explains that she has had elbow since a work related injury on or around 1/25/2024.  She was initially seen on 4/23/2024 by her primary care provider, Dr. Bush.  At that time x-rays were negative.  She was diagnosed with medial epicondylitis as her pain resided mostly medially about the elbow.  She was prescribed diclofenac.      She had a fall, pain had been increasing and increasing since then.  She has done physical therapy for treatment.  Pain is laterally at the epicondyle as well as in the forearm.  She has been using her brace which helps, but she cannot use it while she is at work due to his limited mobility.  She notes that placing pressure with her other hand in a band around her upper forearm tends to help relieve her pain.  She is also tried Vaseline wrap which has been helpful.    Physical therapy over the past 5 visits have been modestly beneficial.  In discussion with Aicha her physical therapist, she recommends possibly considering OT and she did recently provide her with a brace 2 weeks ago    Onset: sudden  Location: right elbow lateral  Quality:  stabbing and sharp  Duration: 7 months   Severity: 10/10 at worst  Timing:constant  Modifying factors:  resting and non-use makes it better, movement and use makes it worse  When picking thing up, she feels pain in latera elbow, and sometimes in medial elbow.  Associated signs & symptoms: pain  Previous similar pain: No  Treatments to date: physical therapy, pain medication, but is not helping.    Additional history: as documented    Review of Systems:  A 10-point review of systems was obtained and is negative except for as noted in the HPI.       MEDICAL HISTORY  Patient Active Problem List   Diagnosis    Shoulder pain, right    Right elbow pain       Current  "Outpatient Medications   Medication Sig Dispense Refill    diclofenac (VOLTAREN) 50 MG EC tablet Take 1 tablet (50 mg) by mouth 2 times daily 60 tablet 1       Allergies   Allergen Reactions    Naprosyn [Naproxen] Hives     Patient was taking naprosyn and robaxin together, unsure which one cause the allergic reaction    Robaxin [Methocarbamol] Hives     Patient was taking naprosyn and robaxin together, unsure which one cause the allergic reaction       No family history on file.    Additional medical/Social/Surgical histories reviewed in [facility] and updated as appropriate.       PHYSICAL EXAM  /68   Ht 1.486 m (4' 10.5\")   Wt 61.2 kg (135 lb)   BMI 27.73 kg/m      General  - normal appearance, in no obvious distress  Musculoskeletal - Right elbow  - inspection: normal joint alignment, no obvious deformity, no soft tissue swelling laterally  - palpation: tender at the origin of the common extensor tendon.  Additional tenderness at the mid forearm extensor muscles near area of radial tunnel, nontender medial epicondyle or common flexor tendons.  - ROM: Full painless elbow range of motion  - strength: 5/5 wrist extension with elbow flexed, 4/5 with elbow extended, minimally painful resisted extension of long finger with elbow flexed, worse with extension, 5/5  strength  - special tests:  (-) varus  (-) valgus  Neuro  - no sensory or motor deficit, grossly normal coordination, normal muscle tone    IMAGING : X-ray elbow 4/23/24 final results and radiologist's interpretation, available in the [facility] health record. Images were reviewed with the patient/family members in the office today. My personal interpretation of the performed imaging is no acute osseous abnormalities, no effusion no loose body, no enthesopathy.     ASSESSMENT & PLAN  Ms. Shay Sherman is a 44 year old year old female who presents to clinic today with lateral greater than medial elbow and lateral forearm pain that seem to begin after a " work-related event on 1/25/2024.    Pain is been mildly improved, but largely recalcitrant to physical therapy, bracing, oral NSAIDs thus far.  Clinical examination and reassuring x-rays today most consistent with lateral epicondylitis of right elbow, additional lateral forearm pain may be related to muscular trigger point versus radial tunnel syndrome.    Diagnosis: Chronic pain of right forearm    Further diagnostic measures as well as treatment options discussed today.  After discussing personally with her physical therapist, Aicha, we agreed to refer her over to hand therapy as they may be more specialized to treat her condition.  I would like her to start using diclofenac gel which was prescribed to her pharmacy.  Continue using wrist brace as able, but because it felt better with pressure at the proximal forearm, I did fit her with a Cho-Pat strap today.  She can use this for work.  If no improvement noted over the next 6 to 8 weeks, would like to see her back to discuss next steps which could include advanced imaging such as MRI, sparing use of corticosteroid injection, or other.    It was a pleasure seeing Anya today.    Michael Holguin DO, CAQSM  Primary Care Sports Medicine

## 2024-07-25 ENCOUNTER — OFFICE VISIT (OUTPATIENT)
Dept: ORTHOPEDICS | Facility: CLINIC | Age: 44
End: 2024-07-25
Payer: OTHER MISCELLANEOUS

## 2024-07-25 VITALS
DIASTOLIC BLOOD PRESSURE: 68 MMHG | BODY MASS INDEX: 27.21 KG/M2 | SYSTOLIC BLOOD PRESSURE: 122 MMHG | HEIGHT: 59 IN | WEIGHT: 135 LBS

## 2024-07-25 DIAGNOSIS — M79.631 PAIN OF RIGHT FOREARM: Primary | ICD-10-CM

## 2024-07-25 PROCEDURE — 99214 OFFICE O/P EST MOD 30 MIN: CPT | Performed by: FAMILY MEDICINE

## 2024-07-25 NOTE — PATIENT INSTRUCTIONS
Thank you for choosing [facility]    DR [facility]REVI[facility] CLINIC LOCATIONS  [place] [address] [facility] [address], [address]th [place]   [place], [place], [address] [place] [address]   [phone]       APPOINTMENTS: [phone]    CARE QUESTIONS: [phone],    BILLING QUESTIONS: [phone]    FAX NUMBER: [phone]              1. Pain of right forearm          Physical Therapy orders have been placed with [facility] (formally [facility])  You can call [phone] to schedule at your convenience.

## 2024-07-25 NOTE — LETTER
7/25/2024      Anya Sherman  [address]  [place] [address]      Dear Colleague,    Thank you for referring your patient, Anya Sherman, to the [facility] [place]. Please see a copy of my visit note below.    CHIEF COMPLAINT:  Pain of the Right Elbow     HISTORY OF PRESENT ILLNESS  Ms. Shay Sherman is a pleasant 44 year old year old female who presents to clinic today with right elbow pain.  Anya explains that she has had elbow since a work related injury on or around 1/25/2024.  She was initially seen on 4/23/2024 by her primary care provider, Dr. Bush.  At that time x-rays were negative.  She was diagnosed with medial epicondylitis as her pain resided mostly medially about the elbow.  She was prescribed diclofenac.      She had a fall, pain had been increasing and increasing since then.  She has done physical therapy for treatment.  Pain is laterally at the epicondyle as well as in the forearm.  She has been using her brace which helps, but she cannot use it while she is at work due to his limited mobility.  She notes that placing pressure with her other hand in a band around her upper forearm tends to help relieve her pain.  She is also tried Vaseline wrap which has been helpful.    Physical therapy over the past 5 visits have been modestly beneficial.  In discussion with Aicha her physical therapist, she recommends possibly considering OT and she did recently provide her with a brace 2 weeks ago    Onset: sudden  Location: right elbow lateral  Quality:  stabbing and sharp  Duration: 7 months   Severity: 10/10 at worst  Timing:constant  Modifying factors:  resting and non-use makes it better, movement and use makes it worse  When picking thing up, she feels pain in latera elbow, and sometimes in medial elbow.  Associated signs & symptoms: pain  Previous similar pain: No  Treatments to date: physical therapy, pain medication, but is not helping.    Additional  "history: as documented    Review of Systems:  A 10-point review of systems was obtained and is negative except for as noted in the HPI.       MEDICAL HISTORY  Patient Active Problem List   Diagnosis     Shoulder pain, right     Right elbow pain       Current Outpatient Medications   Medication Sig Dispense Refill     diclofenac (VOLTAREN) 50 MG EC tablet Take 1 tablet (50 mg) by mouth 2 times daily 60 tablet 1       Allergies   Allergen Reactions     Naprosyn [Naproxen] Hives     Patient was taking naprosyn and robaxin together, unsure which one cause the allergic reaction     Robaxin [Methocarbamol] Hives     Patient was taking naprosyn and robaxin together, unsure which one cause the allergic reaction       No family history on file.    Additional medical/Social/Surgical histories reviewed in [facility] and updated as appropriate.       PHYSICAL EXAM  /68   Ht 1.486 m (4' 10.5\")   Wt 61.2 kg (135 lb)   BMI 27.73 kg/m      General  - normal appearance, in no obvious distress  Musculoskeletal - Right elbow  - inspection: normal joint alignment, no obvious deformity, no soft tissue swelling laterally  - palpation: tender at the origin of the common extensor tendon.  Additional tenderness at the mid forearm extensor muscles near area of radial tunnel, nontender medial epicondyle or common flexor tendons.  - ROM: Full painless elbow range of motion  - strength: 5/5 wrist extension with elbow flexed, 4/5 with elbow extended, minimally painful resisted extension of long finger with elbow flexed, worse with extension, 5/5  strength  - special tests:  (-) varus  (-) valgus  Neuro  - no sensory or motor deficit, grossly normal coordination, normal muscle tone    IMAGING : X-ray elbow 4/23/24 final results and radiologist's interpretation, available in the [facility] health record. Images were reviewed with the patient/family members in the office today. My personal interpretation of the performed imaging is no acute " osseous abnormalities, no effusion no loose body, no enthesopathy.     ASSESSMENT & PLAN  Ms. Shay Sherman is a 44 year old year old female who presents to clinic today with lateral greater than medial elbow and lateral forearm pain that seem to begin after a work-related event on 1/25/2024.    Pain is been mildly improved, but largely recalcitrant to physical therapy, bracing, oral NSAIDs thus far.  Clinical examination and reassuring x-rays today most consistent with lateral epicondylitis of right elbow, additional lateral forearm pain may be related to muscular trigger point versus radial tunnel syndrome.    Diagnosis: Chronic pain of right forearm    Further diagnostic measures as well as treatment options discussed today.  After discussing personally with her physical therapist, Aicha, we agreed to refer her over to hand therapy as they may be more specialized to treat her condition.  I would like her to start using diclofenac gel which was prescribed to her pharmacy.  Continue using wrist brace as able, but because it felt better with pressure at the proximal forearm, I did fit her with a Cho-Pat strap today.  She can use this for work.  If no improvement noted over the next 6 to 8 weeks, would like to see her back to discuss next steps which could include advanced imaging such as MRI, sparing use of corticosteroid injection, or other.    It was a pleasure seeing Anya today.    Michael Holguin DO, [facility]  Primary Care Sports Medicine       Again, thank you for allowing me to participate in the care of your patient.        Sincerely,        Michael Holguin DO

## 2024-07-26 ENCOUNTER — APPOINTMENT (OUTPATIENT)
Dept: INTERPRETER SERVICES | Facility: CLINIC | Age: 44
End: 2024-07-26
Payer: OTHER MISCELLANEOUS

## 2024-07-31 ENCOUNTER — THERAPY VISIT (OUTPATIENT)
Dept: PHYSICAL THERAPY | Facility: CLINIC | Age: 44
End: 2024-07-31
Payer: OTHER MISCELLANEOUS

## 2024-07-31 DIAGNOSIS — M25.511 SHOULDER PAIN, RIGHT: Primary | ICD-10-CM

## 2024-07-31 DIAGNOSIS — M25.521 RIGHT ELBOW PAIN: ICD-10-CM

## 2024-07-31 PROCEDURE — 97530 THERAPEUTIC ACTIVITIES: CPT | Mod: GP

## 2024-07-31 PROCEDURE — 97110 THERAPEUTIC EXERCISES: CPT | Mod: GP

## 2024-08-01 ENCOUNTER — TELEPHONE (OUTPATIENT)
Dept: ORTHOPEDICS | Facility: CLINIC | Age: 44
End: 2024-08-01
Payer: OTHER MISCELLANEOUS

## 2024-08-01 NOTE — TELEPHONE ENCOUNTER
Patient was last seen in clinic by Dr Holguin on 7/25/24 for right forearm pain. Patient started PT for right shoulder and right elbow pain on 4/29/24 and has attended 6 sessions.     Please advise if a letter can be written for the patient to extend PT visits for longer.     Nuzhat Olmos, INESSA, LAT, ATC  Certified Athletic Trainer

## 2024-08-01 NOTE — TELEPHONE ENCOUNTER
[facility] Call Center    Phone Message    May a detailed message be left on voicemail: yes     Reason for Call: Patient asking for Letter to be sent to Her Worker Comp about Extending Her PT. Please call Patient for Details    Action Taken: Message routed to:  [facility] matty    Travel Screening: Not Applicable     Date of Service:

## 2024-08-02 NOTE — TELEPHONE ENCOUNTER
Called patient using  services. Informed her that letter has been completed and inquired about where she would like it sent. She would like it sent to work comp. She stated she does not know the fax number of where to send it. Writer requested she contact her work comp to obtain this information and to call the office back with this information.     She will call back with work comp fax number. She also requests this be mailed to her. Address on file confirmed as accurate mailing address. Letter placed in outgoing mail from  office.    She was provided the call back number and will call us with the fax number for her work comp.     Tracy Goel ATC

## 2024-08-02 NOTE — TELEPHONE ENCOUNTER
I placed a letter in her chart.  She is not on MyChart so she will not receive it.  Please ask her how she would like to obtain the letter.

## 2024-08-06 NOTE — TELEPHONE ENCOUNTER
Other: Pt is calling back with fax number for work comp. Please fax to fax# [phone]     Could we send this information to you in [employer]Cut or would you prefer to receive a phone call?:   Patient would prefer a phone call   Okay to leave a detailed message?: Yes at Cell number on file:    Telephone Information:   Mobile [phone]

## 2024-08-06 NOTE — TELEPHONE ENCOUNTER
Letter was printed off and faxed to the number listed below.     Nuzhat Olmos, INESSA, LAT, ATC  Certified Athletic Trainer

## 2024-08-13 ENCOUNTER — TELEPHONE (OUTPATIENT)
Dept: ORTHOPEDICS | Facility: CLINIC | Age: 44
End: 2024-08-13
Payer: OTHER MISCELLANEOUS

## 2024-08-13 ENCOUNTER — TELEPHONE (OUTPATIENT)
Dept: ORTHOPEDICS | Facility: CLINIC | Age: 44
End: 2024-08-13

## 2024-08-13 NOTE — TELEPHONE ENCOUNTER
Letter was re printed and faxed to the updated fax number provided.     Nuzhat Olmos, INESSA, LAT, ATC  Certified Athletic Trainer

## 2024-08-13 NOTE — TELEPHONE ENCOUNTER
Form or Letter   Type or form/letter needing completion: Pt needs a letter for her employer stating work restrictions still on lifting weight.  Pt is continuing with PT.  Pt still has pain in her R elbow.  The pain feels the same.  She is able to close her fist a little bit.   She cannot do heavy lifting so the current restrictions should continue.    Provider: Dr. Holguin  Date form needed: asap  Once completed: Pt will  at the clinic.  Please call her when it is ready.      Could we send this information to you in [place] or would you prefer to receive a phone call?:   Patient would prefer a phone call   Okay to leave a detailed message?: Yes at Cell number on file:    Telephone Information:   Mobile [phone]

## 2024-08-13 NOTE — TELEPHONE ENCOUNTER
Pt is giving a correction to the fax@ for her WC which is .  This letter is needed for her PT appt tomorrow to be approved.  Please call the pt when it has been sent.

## 2024-08-13 NOTE — TELEPHONE ENCOUNTER
Form or Letter   Type or form/letter needing completion: pt is being told by WC they will not pay for her PT visit tomorrow because they do not have enough info from Dr. Holguin.  She was not able to specify what is needed.  Pt is still having pain and would like to continue therapy.     Pt is giving a correction to the fax@ for her WC which is .      Provider: Dr. Holguin  Date form needed: asap  Once completed: Fax form to: See previous TE for fax@    Could we send this information to you in [facility] or would you prefer to receive a phone call?:   Patient would prefer a phone call   Okay to leave a detailed message?: Yes at Cell number on file:    Telephone Information:   Mobile [phone], needs

## 2024-08-14 ENCOUNTER — APPOINTMENT (OUTPATIENT)
Dept: INTERPRETER SERVICES | Facility: CLINIC | Age: 44
End: 2024-08-14

## 2024-08-14 NOTE — TELEPHONE ENCOUNTER
Called patient via  services. Patient states her work is requesting a new letter from Dr Holguin outlining current restrictions since her previous letter from Dr Bush was from 4/23/24 and it is outdated. The restrictions from that letter state the patient is unable to lift more than 10 pounds.     The patient reports she is still having problems. 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    However, she is still having pain in the elbow and is having difficulty lifting heavy boxes at work. She requests an extension of the 10 pound weight restriction.     Please advise if new letter can be written since there is no instruction about it in Dr Holguin's last note.     Routing to Dr Campos in Dr Holguin's absence.     Nuzhat Olmos, INESSA, LAT, ATC  Certified Athletic Trainer

## 2024-08-19 ENCOUNTER — TELEPHONE (OUTPATIENT)
Dept: ORTHOPEDICS | Facility: CLINIC | Age: 44
End: 2024-08-19

## 2024-08-19 NOTE — TELEPHONE ENCOUNTER
Reason for Call:  Form, our goal is to have forms completed with 7 days, however, some forms may require a visit or additional information.    Type of letter, form or note:  work comp     Who is the form from?:  [employer]    Where did the form come from: form was faxed in    Phone number of person requesting form: [phone]  Can we leave a detailed message on this number:  YES    Desired completion date of form: ASAP      How will form be returned?:  fax to [phone]    Has the patient signed a consent form for release of information (may be included with form)?  NA    Additional comments:     Form was started and place in Dr. Ezio michaud for provider review/ completion at [place].

## 2024-08-21 ENCOUNTER — APPOINTMENT (OUTPATIENT)
Dept: INTERPRETER SERVICES | Facility: CLINIC | Age: 44
End: 2024-08-21

## 2024-08-21 NOTE — TELEPHONE ENCOUNTER
I called and spoke with the patient directly, using the  services network.  The patient would like to  a hard copy of her letter at the [facility], she says that she would pick this up Friday when she is there.  Please print off a copy of her most recent work letter from Dr. Holguin, and leave at the  at the dining clinic, letting their team know that the patient will pick this up Friday.  Thanks,  Dinesh Griffin, ATC

## 2024-08-26 ENCOUNTER — THERAPY VISIT (OUTPATIENT)
Dept: OCCUPATIONAL THERAPY | Facility: CLINIC | Age: 44
End: 2024-08-26
Payer: OTHER MISCELLANEOUS

## 2024-08-26 DIAGNOSIS — M25.521 RIGHT ELBOW PAIN: Primary | ICD-10-CM

## 2024-08-26 PROCEDURE — 97035 APP MDLTY 1+ULTRASOUND EA 15: CPT | Mod: GO | Performed by: OCCUPATIONAL THERAPIST

## 2024-08-26 PROCEDURE — 97110 THERAPEUTIC EXERCISES: CPT | Mod: GO | Performed by: OCCUPATIONAL THERAPIST

## 2024-08-26 PROCEDURE — 97165 OT EVAL LOW COMPLEX 30 MIN: CPT | Mod: GO | Performed by: OCCUPATIONAL THERAPIST

## 2024-08-26 PROCEDURE — 97140 MANUAL THERAPY 1/> REGIONS: CPT | Mod: GO | Performed by: OCCUPATIONAL THERAPIST

## 2024-08-26 NOTE — PROGRESS NOTES
OCCUPATIONAL THERAPY EVALUATION  Type of Visit: Evaluation              Subjective      Presenting condition or subjective complaint:    Date of onset:      Relevant medical history:     Dates & types of surgery:      Prior diagnostic imaging/testing results:       Prior therapy history for the same diagnosis, illness or injury:        Prior Level of Function  Transfers: Independent  Ambulation: Independent  ADL: Independent  IADL: Childcare, Driving, Housekeeping, Laundry, Meal preparation, Work, Yard work    Living Environment  Social support:    and kids  Type of home:   house    Employment:    cleaning [employer]  Hobbies/Interests:  TV    Patient goals for therapy:  Decrease pain to work.       Objective   ADDITIONAL HISTORY:  Right hand dominant  Patient reports symptoms of pain, stiffness/loss of motion, weakness/loss of strength, and edema  Transportation: drives  Currently working in normal job without restrictions    Functional Outcome Measure:   Upper Extremity Functional Index Score:  SCORE:   Column Totals: /80: 36   (A lower score indicates greater disability.)      Objective:    Pain Level (Scale 0-10)   8/26/2024   At Rest 0   With Use 7-9     Pain Description  Date 8/26/2024   Location elbow   Pain Quality Sharp   Frequency intermittent     Pain is worst  daytime or nighttime   Exacerbated by   and lift   Relieved by none   Progression unchanged     Posture  Normal    Sensation  WNL throughout all nerve distributions; per patient report    ROM  Pain Report: - none  + mild    ++ moderate    +++ severe   Elbow and Wrist ROM WNL    Resisted Testing  Pain Report:  - none    + mild    ++ moderate    +++ severe    8/26/2024   Elbow Extension -   Elbow Flexion -   Supination  ++   Pronation -   Wrist Ext with RD, Elbow at side +++   Wrist Ext with UD, Elbow at side +++   Wrist Ext with RD, Elbow Ext NT due to pain   Wrist Ext with UD, Elbow Ext NT due to pain   Wrist Flex with RD, Elbow at side +    Wrist Flex with UD, Elbow at side +   Wrist Flex with RD, Elbow Ext +   Wrist Flex with UD, Elbow Ext +   EDC with Elbow at side +   EDC with Elbow Ext NT   Long Finger Test +++     Neural Tension Testing  RNT: Radial Neurodynamic Test (based on CLEM Crespo's ULNT)   8/26/2024   0-5 Scale 1   Position:   0/5: Arm across abdomen in coronal plane  1/5: Depress shoulder, ER to neutral ABD shoulder to 45 degrees  2/5: IR shoulder to end range, keep elbow at 90 degrees  3/5: Extend elbow to 0 degrees  4/5: Fully pronate forearm  5/5: Flex wrist and fingers with UD  Notes:  (+) indicates beyond grade level but less than retirement to next level  (-) indicates over retirement to level  S1  onset/change of patient's symptoms  S2 definite stop point based on patient's discomfort level    Strength   (Measured in pounds)  Pain Report: - none  + mild    ++ moderate    +++ severe    8/26/2024 8/26/2024   Trials Left  Right   1  2  3     Average 60# 13#+++       Palpation  Pain Report:  - none    + mild    ++ moderate    +++ severe    8/26/2024   Triangular Interval ++   Infraspinatus ++   Teres Major ++   Pec Major +   Pec Minor ++   Proximal Triceps ++   Spiral Groove ++   Distal Triceps -   Anconeus -   ECRB at LEP +++   ECU at LEP +++   EDC at LEP ++   Radial Head +++   Extensor Wad +++   PIN Site ++   MEP +++   Pronator +++         Assessment & Plan   CLINICAL IMPRESSIONS  Medical Diagnosis:      Treatment Diagnosis:      Impression/Assessment: Pt is a 44 year old female presenting to Occupational Therapy due to elbow pain.  The following significant findings have been identified: Impaired strength and Pain.  These identified deficits interfere with their ability to perform self care tasks, work tasks, recreational activities, household chores, driving ,  yard work, and meal planning and preparation as compared to previous level of function.   Patient's limitations or Problem List includes: Pain, Increased edema, Weakness,  Decreased , and Adherence in connective tissue of the right shoulder and elbow which interferes with the patient's ability to perform Self Care Tasks (dressing, eating, bathing, hygiene/toileting), Work Tasks, Sleep Patterns, Recreational Activities, Household Chores, and Driving  as compared to previous level of function.    Clinical Decision Making (Complexity):  Assessment of Occupational Performance: 5 or more Performance Deficits  Occupational Performance Limitations: bathing/showering, toileting, dressing, feeding, hygiene and grooming, care of others, child rearing, driving and community mobility, health management and maintenance, home establishment and management, meal preparation and cleanup, shopping, sleep, work, and play  Clinical Decision Making (Complexity): Low complexity    PLAN OF CARE  Treatment Interventions:  Modalities:  US  Therapeutic Exercise:  AROM  Neuromuscular re-education:  Nerve Gliding and Kinesiotaping  Manual Techniques:  Friction massage and Myofascial release  Self Care:  Self Care Tasks    Long Term Goals          Frequency of Treatment:    Duration of Treatment:       Recommended Referrals to Other Professionals: none  Education Assessment:       Risks and benefits of evaluation/treatment have been explained.   Patient/Family/caregiver agrees with Plan of Care.     Evaluation Time:        Present: Not applicable     Signing Clinician: Beulah Terrell, OT

## 2024-08-26 NOTE — TELEPHONE ENCOUNTER
Patient was going to reach out to workers comp and have them send us a request, will await that request.    Nuzhat Olmos, INESSA, LAT, ATC  Certified Athletic Trainer

## 2024-08-26 NOTE — TELEPHONE ENCOUNTER
Is this a new form? The form was sent on 8/13 per Nuzhat Olmos' note.    I can include my staff to see what the issue is here.

## 2024-09-09 ENCOUNTER — THERAPY VISIT (OUTPATIENT)
Dept: OCCUPATIONAL THERAPY | Facility: CLINIC | Age: 44
End: 2024-09-09

## 2024-09-09 DIAGNOSIS — M25.521 RIGHT ELBOW PAIN: Primary | ICD-10-CM

## 2024-09-09 PROCEDURE — 97140 MANUAL THERAPY 1/> REGIONS: CPT | Mod: GO | Performed by: OCCUPATIONAL THERAPIST

## 2024-09-09 PROCEDURE — 97035 APP MDLTY 1+ULTRASOUND EA 15: CPT | Mod: GO | Performed by: OCCUPATIONAL THERAPIST

## 2024-09-17 ENCOUNTER — THERAPY VISIT (OUTPATIENT)
Dept: OCCUPATIONAL THERAPY | Facility: CLINIC | Age: 44
End: 2024-09-17
Payer: OTHER MISCELLANEOUS

## 2024-09-17 DIAGNOSIS — M25.521 RIGHT ELBOW PAIN: Primary | ICD-10-CM

## 2024-09-17 PROCEDURE — 97140 MANUAL THERAPY 1/> REGIONS: CPT | Mod: GO | Performed by: OCCUPATIONAL THERAPIST

## 2024-09-17 PROCEDURE — 97035 APP MDLTY 1+ULTRASOUND EA 15: CPT | Mod: GO | Performed by: OCCUPATIONAL THERAPIST

## 2024-09-18 ENCOUNTER — OFFICE VISIT (OUTPATIENT)
Dept: FAMILY MEDICINE | Facility: CLINIC | Age: 44
End: 2024-09-18
Payer: COMMERCIAL

## 2024-09-18 VITALS
HEART RATE: 68 BPM | DIASTOLIC BLOOD PRESSURE: 78 MMHG | BODY MASS INDEX: 28.15 KG/M2 | TEMPERATURE: 98.1 F | OXYGEN SATURATION: 100 % | SYSTOLIC BLOOD PRESSURE: 135 MMHG | WEIGHT: 134.1 LBS | HEIGHT: 58 IN | RESPIRATION RATE: 18 BRPM

## 2024-09-18 DIAGNOSIS — Z13.6 CARDIOVASCULAR SCREENING; LDL GOAL LESS THAN 130: ICD-10-CM

## 2024-09-18 DIAGNOSIS — N92.1 IRREGULAR INTERMENSTRUAL BLEEDING: ICD-10-CM

## 2024-09-18 DIAGNOSIS — Z12.31 VISIT FOR SCREENING MAMMOGRAM: ICD-10-CM

## 2024-09-18 DIAGNOSIS — M25.552 HIP PAIN, LEFT: ICD-10-CM

## 2024-09-18 DIAGNOSIS — Z13.1 SCREENING FOR DIABETES MELLITUS: ICD-10-CM

## 2024-09-18 DIAGNOSIS — Z00.00 ANNUAL PHYSICAL EXAM: Primary | ICD-10-CM

## 2024-09-18 DIAGNOSIS — Z11.4 SCREENING FOR HIV (HUMAN IMMUNODEFICIENCY VIRUS): ICD-10-CM

## 2024-09-18 DIAGNOSIS — Z12.4 CERVICAL CANCER SCREENING: ICD-10-CM

## 2024-09-18 DIAGNOSIS — Z12.11 SCREEN FOR COLON CANCER: ICD-10-CM

## 2024-09-18 DIAGNOSIS — Z13.0 SCREENING FOR DEFICIENCY ANEMIA: ICD-10-CM

## 2024-09-18 DIAGNOSIS — Z11.59 NEED FOR HEPATITIS C SCREENING TEST: ICD-10-CM

## 2024-09-18 DIAGNOSIS — Z23 NEED FOR VACCINATION: ICD-10-CM

## 2024-09-18 LAB
ERYTHROCYTE [DISTWIDTH] IN BLOOD BY AUTOMATED COUNT: 13.3 % (ref 10–15)
EST. AVERAGE GLUCOSE BLD GHB EST-MCNC: 105 MG/DL
HBA1C MFR BLD: 5.3 % (ref 0–5.6)
HCT VFR BLD AUTO: 37.9 % (ref 35–47)
HGB BLD-MCNC: 12.5 G/DL (ref 11.7–15.7)
MCH RBC QN AUTO: 26.9 PG (ref 26.5–33)
MCHC RBC AUTO-ENTMCNC: 33 G/DL (ref 31.5–36.5)
MCV RBC AUTO: 82 FL (ref 78–100)
PLATELET # BLD AUTO: 239 10E3/UL (ref 150–450)
RBC # BLD AUTO: 4.64 10E6/UL (ref 3.8–5.2)
WBC # BLD AUTO: 5.8 10E3/UL (ref 4–11)

## 2024-09-18 PROCEDURE — 83036 HEMOGLOBIN GLYCOSYLATED A1C: CPT

## 2024-09-18 PROCEDURE — 85027 COMPLETE CBC AUTOMATED: CPT

## 2024-09-18 PROCEDURE — 99396 PREV VISIT EST AGE 40-64: CPT | Mod: 25

## 2024-09-18 PROCEDURE — 84443 ASSAY THYROID STIM HORMONE: CPT

## 2024-09-18 PROCEDURE — G0145 SCR C/V CYTO,THINLAYER,RESCR: HCPCS

## 2024-09-18 PROCEDURE — 99213 OFFICE O/P EST LOW 20 MIN: CPT | Mod: 25

## 2024-09-18 PROCEDURE — 80053 COMPREHEN METABOLIC PANEL: CPT

## 2024-09-18 PROCEDURE — 36415 COLL VENOUS BLD VENIPUNCTURE: CPT

## 2024-09-18 PROCEDURE — 90715 TDAP VACCINE 7 YRS/> IM: CPT

## 2024-09-18 PROCEDURE — 90471 IMMUNIZATION ADMIN: CPT

## 2024-09-18 PROCEDURE — T1013 SIGN LANG/ORAL INTERPRETER: HCPCS | Mod: GT

## 2024-09-18 PROCEDURE — 80061 LIPID PANEL: CPT

## 2024-09-18 PROCEDURE — 87624 HPV HI-RISK TYP POOLED RSLT: CPT

## 2024-09-18 PROCEDURE — 90656 IIV3 VACC NO PRSV 0.5 ML IM: CPT

## 2024-09-18 PROCEDURE — 90472 IMMUNIZATION ADMIN EACH ADD: CPT

## 2024-09-18 PROCEDURE — 86803 HEPATITIS C AB TEST: CPT

## 2024-09-18 PROCEDURE — 87389 HIV-1 AG W/HIV-1&-2 AB AG IA: CPT

## 2024-09-18 RX ORDER — COPPER 313.4 MG/1
1 INTRAUTERINE DEVICE INTRAUTERINE ONCE
COMMUNITY
Start: 2015-01-01

## 2024-09-18 ASSESSMENT — PAIN SCALES - GENERAL: PAINLEVEL: NO PAIN (0)

## 2024-09-18 NOTE — PATIENT INSTRUCTIONS
[facility]  [address], [address], [place], [place], [address]    [phone]

## 2024-09-18 NOTE — PROGRESS NOTES
"Preventive Care Visit  [facility] VAIBHAV Vences DNP, Geriatric Medicine  Sep 18, 2024      Assessment & Plan     Annual physical exam  Due for pap, mammogram, and colonoscopy    Irregular intermenstrual bleeding  Some light bleeding in between cycles (has copper IUD) and bleeding during intercourse  - Ob/Gyn  Referral; Future  - TSH with free T4 reflex    Hip pain, left  Symptoms per HPI. Will refer to PT to assess for MSK etiology, can refer to ortho/spine if symptoms progress or do not improve.  - Physical Therapy  Referral; Future    Screen for colon cancer  Due for screening, will provide referral  - Colonoscopy Screening  Referral; Future    Screening for HIV (human immunodeficiency virus)  - HIV Antigen Antibody Combo    Need for hepatitis C screening test  - Hepatitis C Screen Reflex to HCV RNA Quant and Genotype    Cervical cancer screening  Last pap date unknown, will repeat w/ history of abnormal bleeding  - HPV and Gynecologic Cytology Panel - Recommended Age 30 - 65 Years    Visit for screening mammogram  Will provide phone number to schedule mammogram  - MA Screening Bilateral w/ Kirk; Future    Screening for diabetes mellitus  - Hemoglobin A1c    CARDIOVASCULAR SCREENING; LDL GOAL LESS THAN 130  - Lipid panel reflex to direct LDL Non-fasting    Screening for deficiency anemia  - CBC with platelets    Need for vaccination  - INFLUENZA VACCINE,SPLIT VIRUS,TRIVALENT,PF(FLUZONE)  - TDAP 7+ (ADACEL,BOOSTRIX)          BMI  Estimated body mass index is 28.03 kg/m  as calculated from the following:    Height as of this encounter: 1.473 m (4' 10\").    Weight as of this encounter: 60.8 kg (134 lb 1.6 oz).   Weight management plan: Discussed healthy diet and exercise guidelines      CONSULTATION/REFERRAL to OBGYN  FUTURE APPOINTMENTS:       - Follow-up for annual visit or as needed  Work on weight loss  Regular exercise    Steph Garcia is a 44 year old, " "presenting for the following:  Physical and Back Pain (Low back, left side pain )         Health Care Directive  Patient does not have a Health Care Directive or Living Will: Discussed advance care planning with patient; however, patient declined at this time.    Presents here for physical with use of     -Has had some ongoing left hip pain and tenderness. Felt a \"ball\" in the hip, causes tenderness at times especially when sitting.  No known injury. Moved a machine at work last year and felt that it was a strain, unsure if this is what caused the pain initially  The \"ball\" has been there a long time and did not cause pain. Pain started about 4 months ago  Describes pain as \"stabbing\" at times when it comes  Has not tried medication but stretches helps    -Having regular periods, some bleeding during intercourse. No pain. Notes they are heavy but does not need to change pad more than every 1-2 hours.  Also notices scant amount of vaginal bleeding when lifting heavy objects. No pelvic or vaginal pain.                  9/18/2024   General Health   How would you rate your overall physical health? (!) FAIR   Feel stress (tense, anxious, or unable to sleep) Only a little      (!) STRESS CONCERN      9/18/2024   Nutrition   Three or more servings of calcium each day? (!) I DON'T KNOW   Diet: I don't know   How many servings of fruit and vegetables per day? (!) 0-1   How many sweetened beverages each day? 0-1            9/18/2024   Exercise   Days per week of moderate/strenous exercise 0 days   Average minutes spent exercising at this level 20 min      (!) EXERCISE CONCERN      9/18/2024   Social Factors   Frequency of gathering with friends or relatives Once a week   Worry food won't last until get money to buy more No   Food not last or not have enough money for food? Yes   Do you have housing? (Housing is defined as stable permanent housing and does not include staying ouside in a car, in a tent, in " an abandoned building, in an overnight shelter, or couch-surfing.) No   Are you worried about losing your housing? No   Lack of transportation? No   Unable to get utilities (heat,electricity)? No   Want help with housing or utility concern? No      (!) FOOD SECURITY CONCERN PRESENT(!) HOUSING CONCERN PRESENT      9/18/2024   Dental   Dentist two times every year? (!) NO            9/18/2024   TB Screening   Were you born outside of the [place]? Yes            Today's PHQ-2 Score:       9/18/2024     3:23 PM   PHQ-2 ( 1999 [employer])   Q1: Little interest or pleasure in doing things 2   Q2: Feeling down, depressed or hopeless 0   PHQ-2 Score 2   Q1: Little interest or pleasure in doing things More than half the days   Q2: Feeling down, depressed or hopeless Not at all   PHQ-2 Score 2           9/18/2024   Substance Use   Alcohol more than 3/day or more than 7/wk Not Applicable   Do you use any other substances recreationally? (!) DECLINE        Social History     Tobacco Use    Smoking status: Never    Smokeless tobacco: Never   Substance Use Topics    Alcohol use: Not Currently          Mammogram Screening - Mammogram every 1-2 years updated in Health Maintenance based on mutual decision making        9/18/2024   STI Screening   New sexual partner(s) since last STI/HIV test? No        History of abnormal Pap smear: No - age 30- 64 PAP with HPV every 5 years recommended       ASCVD Risk   The ASCVD Risk score (Kelvin ZAFAR, et al., 2019) failed to calculate for the following reasons:    Cannot find a previous HDL lab    Cannot find a previous total cholesterol lab        9/18/2024   Contraception/Family Planning   Questions about contraception or family planning (!) YES            Reviewed and updated as needed this visit by Provider   Tobacco   Meds  Problems  Med Hx  Surg Hx  Fam Hx  Soc Hx Sexual   Activity          Past Medical History:   Diagnosis Date    NO ACTIVE PROBLEMS      Past Surgical History:  "  Procedure Laterality Date    NO HISTORY OF SURGERY       Lab work is in process      Review of Systems  Constitutional, HEENT, cardiovascular, pulmonary, gi and gu systems are negative, except as otherwise noted.     Objective    Exam  /78 (BP Location: Right arm, Patient Position: Sitting, Cuff Size: Adult Regular)   Pulse 68   Temp 98.1  F (36.7  C) (Temporal)   Resp 18   Ht 1.473 m (4' 10\")   Wt 60.8 kg (134 lb 1.6 oz)   SpO2 100%   Breastfeeding No   BMI 28.03 kg/m     Estimated body mass index is 28.03 kg/m  as calculated from the following:    Height as of this encounter: 1.473 m (4' 10\").    Weight as of this encounter: 60.8 kg (134 lb 1.6 oz).    Physical Exam  Vitals reviewed.   HENT:      Head: Normocephalic.      Right Ear: Tympanic membrane, ear canal and external ear normal.      Left Ear: Tympanic membrane, ear canal and external ear normal.   Eyes:      Pupils: Pupils are equal, round, and reactive to light.   Cardiovascular:      Rate and Rhythm: Normal rate and regular rhythm.      Pulses: Normal pulses.      Heart sounds: Normal heart sounds. No murmur heard.  Pulmonary:      Effort: Pulmonary effort is normal. No respiratory distress.      Breath sounds: Normal breath sounds. No wheezing, rhonchi or rales.   Abdominal:      General: Bowel sounds are normal. There is no distension.      Palpations: Abdomen is soft. There is no mass.      Tenderness: There is no abdominal tenderness.   Genitourinary:     Vagina: Normal.      Cervix: Friability, erythema and cervical bleeding present.      Comments: IUD strings visualized  Musculoskeletal:         General: Normal range of motion.      Cervical back: Normal range of motion and neck supple.      Right lower leg: No edema.      Left lower leg: No edema.   Skin:     General: Skin is warm and dry.   Neurological:      Mental Status: She is alert and oriented to person, place, and time.   Psychiatric:         Mood and Affect: Mood normal.   "       Behavior: Behavior normal.               Signed Electronically by: Radha Vences, DNP, APRN, CNP

## 2024-09-19 LAB
ALBUMIN SERPL BCG-MCNC: 4.4 G/DL (ref 3.5–5.2)
ALP SERPL-CCNC: 86 U/L (ref 40–150)
ALT SERPL W P-5'-P-CCNC: 20 U/L (ref 0–50)
ANION GAP SERPL CALCULATED.3IONS-SCNC: 11 MMOL/L (ref 7–15)
AST SERPL W P-5'-P-CCNC: 25 U/L (ref 0–45)
BILIRUB SERPL-MCNC: 0.2 MG/DL
BUN SERPL-MCNC: 12.1 MG/DL (ref 6–20)
CALCIUM SERPL-MCNC: 8.8 MG/DL (ref 8.8–10.4)
CHLORIDE SERPL-SCNC: 103 MMOL/L (ref 98–107)
CHOLEST SERPL-MCNC: 141 MG/DL
CREAT SERPL-MCNC: 0.54 MG/DL (ref 0.51–0.95)
EGFRCR SERPLBLD CKD-EPI 2021: >90 ML/MIN/1.73M2
FASTING STATUS PATIENT QL REPORTED: NO
FASTING STATUS PATIENT QL REPORTED: NO
GLUCOSE SERPL-MCNC: 86 MG/DL (ref 70–99)
HCO3 SERPL-SCNC: 23 MMOL/L (ref 22–29)
HCV AB SERPL QL IA: NONREACTIVE
HDLC SERPL-MCNC: 52 MG/DL
HIV 1+2 AB+HIV1 P24 AG SERPL QL IA: NONREACTIVE
HPV HR 12 DNA CVX QL NAA+PROBE: NEGATIVE
HPV16 DNA CVX QL NAA+PROBE: NEGATIVE
HPV18 DNA CVX QL NAA+PROBE: NEGATIVE
HUMAN PAPILLOMA VIRUS FINAL DIAGNOSIS: NORMAL
LDLC SERPL CALC-MCNC: 78 MG/DL
NONHDLC SERPL-MCNC: 89 MG/DL
POTASSIUM SERPL-SCNC: 3.5 MMOL/L (ref 3.4–5.3)
PROT SERPL-MCNC: 8.1 G/DL (ref 6.4–8.3)
SODIUM SERPL-SCNC: 137 MMOL/L (ref 135–145)
TRIGL SERPL-MCNC: 54 MG/DL
TSH SERPL DL<=0.005 MIU/L-ACNC: 1.13 UIU/ML (ref 0.3–4.2)

## 2024-09-20 ENCOUNTER — TELEPHONE (OUTPATIENT)
Dept: FAMILY MEDICINE | Facility: CLINIC | Age: 44
End: 2024-09-20
Payer: COMMERCIAL

## 2024-09-20 ENCOUNTER — APPOINTMENT (OUTPATIENT)
Dept: INTERPRETER SERVICES | Facility: CLINIC | Age: 44
End: 2024-09-20
Payer: COMMERCIAL

## 2024-09-20 NOTE — TELEPHONE ENCOUNTER
Called patient using  services, [employer],  with [facility] on the line. Gave provider message. Patient verbalized understanding.

## 2024-09-20 NOTE — TELEPHONE ENCOUNTER
Radha Vences, JUAN SPA[facility] - Primary Care  Please call patient and let her know that her blood count, metabolic panel, cholesterol levels, hemoglobin A1c, and circulating thyroid hormone level are all within normal range. There is no sign of diabetes, anemia, or thyroid disease. Hepatitis C and HIV screening tests are negative.    Thank you!  Radha

## 2024-09-23 LAB
BKR AP ASSOCIATED HPV REPORT: NORMAL
BKR LAB AP GYN ADEQUACY: NORMAL
BKR LAB AP GYN INTERPRETATION: NORMAL
BKR LAB AP PREVIOUS ABNORMAL: NORMAL
PATH REPORT.COMMENTS IMP SPEC: NORMAL
PATH REPORT.COMMENTS IMP SPEC: NORMAL
PATH REPORT.RELEVANT HX SPEC: NORMAL

## 2024-09-25 ENCOUNTER — THERAPY VISIT (OUTPATIENT)
Dept: OCCUPATIONAL THERAPY | Facility: CLINIC | Age: 44
End: 2024-09-25
Payer: OTHER MISCELLANEOUS

## 2024-09-25 DIAGNOSIS — M25.521 RIGHT ELBOW PAIN: Primary | ICD-10-CM

## 2024-09-25 PROCEDURE — 97110 THERAPEUTIC EXERCISES: CPT | Mod: GO | Performed by: OCCUPATIONAL THERAPIST

## 2024-09-25 PROCEDURE — 97140 MANUAL THERAPY 1/> REGIONS: CPT | Mod: GO | Performed by: OCCUPATIONAL THERAPIST

## 2024-09-25 PROCEDURE — 97035 APP MDLTY 1+ULTRASOUND EA 15: CPT | Mod: GO | Performed by: OCCUPATIONAL THERAPIST

## 2024-10-02 ENCOUNTER — THERAPY VISIT (OUTPATIENT)
Dept: OCCUPATIONAL THERAPY | Facility: CLINIC | Age: 44
End: 2024-10-02
Payer: OTHER MISCELLANEOUS

## 2024-10-02 DIAGNOSIS — M25.521 RIGHT ELBOW PAIN: Primary | ICD-10-CM

## 2024-10-02 PROCEDURE — 97110 THERAPEUTIC EXERCISES: CPT | Mod: GO | Performed by: OCCUPATIONAL THERAPIST

## 2024-10-02 PROCEDURE — 97140 MANUAL THERAPY 1/> REGIONS: CPT | Mod: GO | Performed by: OCCUPATIONAL THERAPIST

## 2024-10-02 PROCEDURE — 97035 APP MDLTY 1+ULTRASOUND EA 15: CPT | Mod: GO | Performed by: OCCUPATIONAL THERAPIST

## 2024-10-16 ENCOUNTER — TELEPHONE (OUTPATIENT)
Dept: ORTHOPEDICS | Facility: CLINIC | Age: 44
End: 2024-10-16
Payer: COMMERCIAL

## 2024-10-16 ENCOUNTER — THERAPY VISIT (OUTPATIENT)
Dept: OCCUPATIONAL THERAPY | Facility: CLINIC | Age: 44
End: 2024-10-16
Payer: OTHER MISCELLANEOUS

## 2024-10-16 DIAGNOSIS — M25.521 RIGHT ELBOW PAIN: Primary | ICD-10-CM

## 2024-10-16 PROCEDURE — 97035 APP MDLTY 1+ULTRASOUND EA 15: CPT | Mod: GO | Performed by: OCCUPATIONAL THERAPIST

## 2024-10-16 PROCEDURE — 97110 THERAPEUTIC EXERCISES: CPT | Mod: GO | Performed by: OCCUPATIONAL THERAPIST

## 2024-10-16 PROCEDURE — 97140 MANUAL THERAPY 1/> REGIONS: CPT | Mod: GO | Performed by: OCCUPATIONAL THERAPIST

## 2024-10-16 NOTE — TELEPHONE ENCOUNTER
ERVIN worker's compensation paperwork filled out and returned via fax to [phone].    Laurel Garrido [facility]    Sports Medicine Triage-[place] supporting Dr. Holguin

## 2024-10-23 ENCOUNTER — THERAPY VISIT (OUTPATIENT)
Dept: OCCUPATIONAL THERAPY | Facility: CLINIC | Age: 44
End: 2024-10-23
Payer: OTHER MISCELLANEOUS

## 2024-10-23 DIAGNOSIS — M25.521 RIGHT ELBOW PAIN: Primary | ICD-10-CM

## 2024-10-23 PROCEDURE — 97110 THERAPEUTIC EXERCISES: CPT | Mod: GO | Performed by: OCCUPATIONAL THERAPIST

## 2024-10-23 PROCEDURE — 97140 MANUAL THERAPY 1/> REGIONS: CPT | Mod: GO | Performed by: OCCUPATIONAL THERAPIST

## 2024-10-23 PROCEDURE — 97035 APP MDLTY 1+ULTRASOUND EA 15: CPT | Mod: GO | Performed by: OCCUPATIONAL THERAPIST

## 2024-11-06 ENCOUNTER — THERAPY VISIT (OUTPATIENT)
Dept: OCCUPATIONAL THERAPY | Facility: CLINIC | Age: 44
End: 2024-11-06
Payer: OTHER MISCELLANEOUS

## 2024-11-06 DIAGNOSIS — M25.521 RIGHT ELBOW PAIN: Primary | ICD-10-CM

## 2024-11-06 PROCEDURE — 97140 MANUAL THERAPY 1/> REGIONS: CPT | Mod: GO | Performed by: OCCUPATIONAL THERAPIST

## 2024-11-06 PROCEDURE — 97035 APP MDLTY 1+ULTRASOUND EA 15: CPT | Mod: GO | Performed by: OCCUPATIONAL THERAPIST

## 2024-11-13 ENCOUNTER — THERAPY VISIT (OUTPATIENT)
Dept: OCCUPATIONAL THERAPY | Facility: CLINIC | Age: 44
End: 2024-11-13
Payer: OTHER MISCELLANEOUS

## 2024-11-13 DIAGNOSIS — M25.521 RIGHT ELBOW PAIN: Primary | ICD-10-CM

## 2024-11-13 PROCEDURE — 97140 MANUAL THERAPY 1/> REGIONS: CPT | Mod: GO | Performed by: OCCUPATIONAL THERAPIST

## 2024-11-13 PROCEDURE — 97035 APP MDLTY 1+ULTRASOUND EA 15: CPT | Mod: GO | Performed by: OCCUPATIONAL THERAPIST

## 2024-12-04 ENCOUNTER — THERAPY VISIT (OUTPATIENT)
Dept: OCCUPATIONAL THERAPY | Facility: CLINIC | Age: 44
End: 2024-12-04
Payer: OTHER MISCELLANEOUS

## 2024-12-04 DIAGNOSIS — M25.521 RIGHT ELBOW PAIN: Primary | ICD-10-CM

## 2024-12-04 PROCEDURE — 97140 MANUAL THERAPY 1/> REGIONS: CPT | Mod: GO | Performed by: OCCUPATIONAL THERAPIST

## 2024-12-04 PROCEDURE — 97035 APP MDLTY 1+ULTRASOUND EA 15: CPT | Mod: GO | Performed by: OCCUPATIONAL THERAPIST

## 2024-12-06 NOTE — PROGRESS NOTES
ESTABLISHED PATIENT FOLLOW-UP:  No chief complaint on file.       HISTORY OF PRESENT ILLNESS  Ms. Shay Sherman is a pleasant 44 year old year old female who presents to clinic today for follow-up of right elbow pain.     Date of injury: 1/25/2024  Date last seen: 7/25/2024  Following Therapeutic Plan: PT/OT - 10 visits  Pain: Improved but then returned to previous state despite therapy  Function: Soreness with ROM  Interval History: Patient has sharp pain on the lateral epicondyle with regular activities at work such as spray bottles and gripping objects with some weight. She also references clicking/friction when performing the therapy.    Medial elbow pain has improved.      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 Otherwise feels comfortable with 10# restrictions. Would like these renewed.     utilized for visit.    Additional medical/Social/Surgical histories reviewed in [facility] and updated as appropriate.    REVIEW OF SYSTEMS (12/6/2024)  CONSTITUTIONAL: Denies fever and weight loss  GASTROINTESTINAL: Denies abdominal pain, nausea, vomiting  MUSCULOSKELETAL: See HPI  SKIN: Denies any recent rash or lesion  NEUROLOGICAL: Denies numbness or focal weakness     PHYSICAL EXAM  There were no vitals taken for this visit.    General  - normal appearance, in no obvious distress  Musculoskeletal - right elbow  - inspection: normal joint alignment, no obvious deformity, no soft tissue swelling laterally  - palpation: tender at the origin of the common extensor tendon into extensor musculature.  - ROM:  160 deg flexion   0 deg extension   90 deg pronation   90 deg supination  - strength: 5/5 wrist extension with elbow flexed, 4/5 with elbow extended, minimally painful resisted extension of long finger with elbow flexed, worse with extension, 5/5  strength  - special tests:  (-) varus  (-) valgus  Neuro  - no sensory or motor deficit, grossly normal  coordination, normal muscle tone      IMAGING :   ELBOW RIGHT 2 VIEWS   4/23/2024 1:35 PM      HISTORY: Chronic pain of right elbow.     COMPARISON: None.                                                                      IMPRESSION: Normal. No fracture, effusion or calcified intra-articular  body.     MICHAEL SCHAFER MD         ASSESSMENT & PLAN  Ms. Shay Sherman is a 44 year old year old female who presents to clinic today for follow up of right elbow pain.      Ongoing right elbow pain laterally despite extensive OT, PT, bracing, activity restrictions, modalities such as ultrasound. Fortunately right shoulder and medial elbow pain have persisted.  DDX includes lateral epicondylitis, common extensor tendon tear, radial tunnel syndrome, vs. Intraarticular pathology.  XR unremarkable.    Diagnosis: Injury of right elbow, chronic pain of right elbow.    We discussed using Qatari translation service, language line, that it is prudent to pursue an MRI of her right elbow given lack of significant benefit with all conservative measures to date.  We had a brief discussion about possibility of corticosteroid injection to the lateral common extensor tendon, however we agreed that given recalcitrance of this work-related injury would best be served after an MRI to further elucidate this pathology.  I would like her to continue with her bracing, her HEP.  I did provide prescription for oral diclofenac 75 mg to be taken as needed only.  Lastly we discussed her work note/restrictions and we agreed to continue 10 pound lifting restrictions with minimizing use of spray bottles.    I would like to see Radha in person after MRI of the right elbow is completed so that we can pursue additional remedial options to improve this injury.    It was a pleasure seeing Radha.    Michael Holguin DO, [facility]  Primary Care Sports Medicine

## 2024-12-11 ENCOUNTER — OFFICE VISIT (OUTPATIENT)
Dept: ORTHOPEDICS | Facility: CLINIC | Age: 44
End: 2024-12-11
Payer: OTHER MISCELLANEOUS

## 2024-12-11 DIAGNOSIS — M25.521 CHRONIC PAIN OF RIGHT ELBOW: Primary | ICD-10-CM

## 2024-12-11 DIAGNOSIS — G89.29 CHRONIC PAIN OF RIGHT ELBOW: Primary | ICD-10-CM

## 2024-12-11 PROCEDURE — 99213 OFFICE O/P EST LOW 20 MIN: CPT | Performed by: FAMILY MEDICINE

## 2024-12-11 RX ORDER — DICLOFENAC SODIUM 75 MG/1
75 TABLET, DELAYED RELEASE ORAL 2 TIMES DAILY
Qty: 20 TABLET | Refills: 0 | Status: SHIPPED | OUTPATIENT
Start: 2024-12-11

## 2024-12-11 NOTE — PATIENT INSTRUCTIONS
Thank you for choosing [facility]    DR [facility]REVI[facility]S CLINIC LOCATIONS  [place] [address] [address] [facility], [address]th [place]   [place], [place], [address] [place] [address]   [phone]       APPOINTMENTS: [phone]    CARE QUESTIONS: [phone],    BILLING QUESTIONS: [phone]    FAX NUMBER: [phone]            1. Pain of right forearm        An order for an MRI was placed today. You may call directly to schedule at [phone] at your convenience.

## 2024-12-11 NOTE — LETTER
12/11/2024      Radha Sherman  [address]  [place] [address]      Dear Colleague,    Thank you for referring your patient, Radha Sherman, to the [facility] [place]. Please see a copy of my visit note below.    ESTABLISHED PATIENT FOLLOW-UP:  No chief complaint on file.       HISTORY OF PRESENT ILLNESS  Ms. Shay Sherman is a pleasant 44 year old year old female who presents to clinic today for follow-up of right elbow pain.     Date of injury: 1/25/2024  Date last seen: 7/25/2024  Following Therapeutic Plan: PT/OT - 10 visits  Pain: Improved but then returned to previous state despite therapy  Function: Soreness with ROM  Interval History: Patient has sharp pain on the lateral epicondyle with regular activities at work such as spray bottles and gripping objects with some weight. She also references clicking/friction when performing the therapy.    Medial elbow pain has improved.      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 Otherwise feels comfortable with 10# restrictions. Would like these renewed.     utilized for visit.    Additional medical/Social/Surgical histories reviewed in [facility] and updated as appropriate.    REVIEW OF SYSTEMS (12/6/2024)  CONSTITUTIONAL: Denies fever and weight loss  GASTROINTESTINAL: Denies abdominal pain, nausea, vomiting  MUSCULOSKELETAL: See HPI  SKIN: Denies any recent rash or lesion  NEUROLOGICAL: Denies numbness or focal weakness     PHYSICAL EXAM  There were no vitals taken for this visit.    General  - normal appearance, in no obvious distress  Musculoskeletal - right elbow  - inspection: normal joint alignment, no obvious deformity, no soft tissue swelling laterally  - palpation: tender at the origin of the common extensor tendon into extensor musculature.  - ROM:  160 deg flexion   0 deg extension   90 deg pronation   90 deg supination  - strength: 5/5 wrist extension  with elbow flexed, 4/5 with elbow extended, minimally painful resisted extension of long finger with elbow flexed, worse with extension, 5/5  strength  - special tests:  (-) varus  (-) valgus  Neuro  - no sensory or motor deficit, grossly normal coordination, normal muscle tone      IMAGING :   ELBOW RIGHT 2 VIEWS   4/23/2024 1:35 PM      HISTORY: Chronic pain of right elbow.     COMPARISON: None.                                                                      IMPRESSION: Normal. No fracture, effusion or calcified intra-articular  body.     MARISELA SCHAFER MD         ASSESSMENT & PLAN  Ms. Shay Sherman is a 44 year old year old female who presents to clinic today for follow up of right elbow pain.      Ongoing right elbow pain laterally despite extensive OT, PT, bracing, activity restrictions, modalities such as ultrasound. Fortunately right shoulder and medial elbow pain have persisted.  DDX includes lateral epicondylitis, common extensor tendon tear, radial tunnel syndrome, vs. Intraarticular pathology.  XR unremarkable.    Diagnosis: Injury of right elbow, chronic pain of right elbow.    We discussed using Kinyarwanda translation service, language line, that it is prudent to pursue an MRI of her right elbow given lack of significant benefit with all conservative measures to date.  We had a brief discussion about possibility of corticosteroid injection to the lateral common extensor tendon, however we agreed that given recalcitrance of this work-related injury would best be served after an MRI to further elucidate this pathology.  I would like her to continue with her bracing, her HEP.  I did provide prescription for oral diclofenac 75 mg to be taken as needed only.  Lastly we discussed her work note/restrictions and we agreed to continue 10 pound lifting restrictions with minimizing use of spray bottles.    I would like to see Radha in person after MRI of the right elbow is completed so that we can pursue  additional remedial options to improve this injury.    It was a pleasure seeing Radha.    Michael Holguin DO, [facility]  Primary Care Sports Medicine      Again, thank you for allowing me to participate in the care of your patient.        Sincerely,        Michael Holguin DO

## 2024-12-11 NOTE — LETTER
December 11, 2024      Radha Sherman  [address]  [place] [address]        To Whom It May Concern:     Anya Sherman was seen in our clinic on 7/25/24 for orthopedic related condition that began after work injury on 1/25/2024.  I have recommended that she continues a 10 pound maximum lifting restriction with her right upper extremity. I also recommend limiting use of spray bottles as able. This will continue until Jan 30, 2025 as we continue to treat this condition.  I will see her before this expiration date so that we can update her work restrictions.  Thank you for understanding in this matter.        Sincerely,           Michael Holguin DO [facility]  Primary Care Sports Medicine  [facility]

## 2025-01-02 ENCOUNTER — ANCILLARY PROCEDURE (OUTPATIENT)
Dept: MRI IMAGING | Facility: CLINIC | Age: 45
End: 2025-01-02
Attending: FAMILY MEDICINE
Payer: OTHER MISCELLANEOUS

## 2025-01-02 DIAGNOSIS — G89.29 CHRONIC PAIN OF RIGHT ELBOW: ICD-10-CM

## 2025-01-02 DIAGNOSIS — M25.521 CHRONIC PAIN OF RIGHT ELBOW: ICD-10-CM

## 2025-01-02 PROCEDURE — 73221 MRI JOINT UPR EXTREM W/O DYE: CPT | Mod: 26 | Performed by: RADIOLOGY

## 2025-01-02 PROCEDURE — 73221 MRI JOINT UPR EXTREM W/O DYE: CPT | Mod: RT

## 2025-01-06 ENCOUNTER — TELEPHONE (OUTPATIENT)
Dept: ORTHOPEDICS | Facility: CLINIC | Age: 45
End: 2025-01-06
Payer: COMMERCIAL

## 2025-01-06 NOTE — TELEPHONE ENCOUNTER
----- Message from Michael Holguin sent at 1/6/2025 10:47 AM CST -----  Hi,     Can we have Anya follow up with a virtual visit to discuss this week.     Thank you!  ----- Message -----  From: Alvin Radiant Ib  Sent: 1/2/2025  10:31 AM CST  To: Michael Holguin, DO

## 2025-01-06 NOTE — TELEPHONE ENCOUNTER
ATC left voicemail for patient to schedule a telephone visit this week with  to discuss MRI results. Patient was instructed to call back to schedule. OK to use a JUJU or sameday spot.

## 2025-01-14 NOTE — PROGRESS NOTES
Extensive discussion about PRP, Tenex, recovery protocols ESTABLISHED PATIENT FOLLOW-UP:  Pain of the Right Elbow     HISTORY OF PRESENT ILLNESS  Ms. Shay Sherman is a pleasant 44 year old year old female who presents to clinic today for follow-up of right elbow pain.     Date of injury/onset: 1/25/24  Date last seen: 12/11/2024  Following Therapeutic Plan: MRI review  Pain: No changes  Function: No changes  Interval History: Patient completed Physical Therapy x 10 and is reviewing MRI today. Continued difficulty with lateral elbow pain since fall while at work on 1/25/24.  Medial epicondyle has improved at right elbow however right lateral elbow pain persists on a daily basis.    Still feels comfortable with 10# restrictions. Work has been accommodating.     Patient declined  and wished to utilize her son in room, Dex, for assistance.     Additional medical/Social/Surgical histories reviewed in [facility] and updated as appropriate.    REVIEW OF SYSTEMS (1/14/2025)  CONSTITUTIONAL: Denies fever and weight loss  GASTROINTESTINAL: Denies abdominal pain, nausea, vomiting  MUSCULOSKELETAL: See HPI  SKIN: Denies any recent rash or lesion  NEUROLOGICAL: Denies numbness or focal weakness     PHYSICAL EXAM  There were no vitals taken for this visit.    General  - normal appearance, in no obvious distress  Musculoskeletal - right elbow  - inspection: normal joint alignment, no obvious deformity, no soft tissue swelling laterally  - palpation: tender at the origin of the common extensor tendon into extensor musculature.  - ROM:  160 deg flexion              0 deg extension              90 deg pronation              90 deg supination  - strength: 5/5 wrist extension with elbow flexed, 4/5 with elbow extended, minimally painful resisted extension of long finger with elbow flexed, worse with extension, 5/5  strength  - special tests:  (-) varus  (-) valgus  Neuro  - no sensory or motor deficit, grossly normal  coordination, normal muscle tone        IMAGING :   ELBOW RIGHT 2 VIEWS   4/23/2024 1:35 PM      HISTORY: Chronic pain of right elbow.     COMPARISON: None.                                                                      IMPRESSION: Normal. No fracture, effusion or calcified intra-articular  body.     MARISELA SCHAFER MD     MRI of right elbow without  contrast 1/2/2025 8:25 AM     History: Chronic pain of right elbow; Chronic pain of right elbow     Techniques: Multiplanar multisequence imaging through the right elbow  were obtained without administration of intravenous gadolinium  contrast.     Comparison: Elbow radiograph 4/23/2024     Findings:  External marker placed over the anterior aspect of the elbow at the  level of radiocapitellar joint.  Medial compartment  Ulnar collateral ligament: Intact     Common flexor tendon: Very low-grade tearing of the common flexor  tendon at the humeral attachment.     Medial epicondyle: No edema.     Lateral compartment  Radial collateral ligament: Intact     Lateral ulnar collateral ligament: Intact.     Radial annular ligament: Intact.     Common extensor tendon: Tendinosis with moderate grade tearing of the  common extensor tendon at the humeral attachment.     Lateral epicondyle: No edema.     Posterior compartment  Triceps: Intact without tendinosis.     Olecranon: No edema.     Bursitis: No significant fluid.     Anterior compartment  Biceps: Intact.     Brachialis: Mild tendinopathy of the distal Achilles tendon at and  just proximal to its attachment site.     Bicipitoradial bursa: No significant fluid.     Articulations  No evidence of erosion. No significant effusion.     Bones (other than subarticular marrow): No evidence of fracture,  marrow contusion or marrow infiltrative change.     Others:     Nerves: Short segment T2 hyperintensity of the ulnar nerve proximal to  the cubital tunnel.                                                                             Impression:  1. Tendinosis with moderate grade tearing of the common extensor  tendon at the humeral attachment.  2. Very low-grade tearing of the common flexor tendon at the humeral  attachment.  3. Low-grade tendinosis of the brachialis tendon at its distal  attachment site.     I have personally reviewed the examination and initial interpretation  and I agree with the findings.     JUTTA ELLERMANN, MD      ASSESSMENT & PLAN  Ms. Shay Sherman is a 44 year old year old female who presents to clinic today for follow-up regarding right elbow pain that is persistent since work-related injury on 1/25/2024.    Most recent MRI performed on 1/2/2025 reveals moderate grade tearing of the common extensor tendon as well as tendinosis of region.  Although tendinosis is unlikely to be relating to her fall, I certainly suspect her moderate grade tearing of the common extensor tendon as well as very low-grade tearing of the common flexor tendon is likely secondary to her fall.  She had no pain prior to this fall and therefore I believe we can attribute this partial-thickness tear to her injury.      Treatment options discussed in detail ranging from ongoing conservative cares including bracing, therapy I also discussed more interventional options such as PRP injection, Tenex percutaneous tenotomy procedures.  Lastly we discussed corticosteroid injection as an option.Given the moderate grade tear of the common extensor tendon and her interest in pursuing treatments that will potentially heal this region, I have recommended PRP we will work with her Workmen's Comp rep for approval.  I will coordinate with my staff.    She does have a guideline post procedure that would include an up to 8-week rehab protocol including occupational therapy, brief sling immobilization as well as bracing.  She understands this.  We also discussed work modifications will be necessary including 1 week away from work followed by limitations of 5  pounds, incrementally increasing towards full return to duty.  My team will reach out to her for next steps in approval of PRP through Workmen's Comp.    All questions answered.  Accompanied by her son Dex crespo.    34 minutes on date of the encounter doing chart review, history and examination, independent imaging review, documentation, and additional activitie extensive discussion about PRP, Tenex, recovery protocols.    It was a pleasure seeing Radha.    Michael Holguin DO, CAQSM  Primary Care Sports Medicine

## 2025-01-15 ENCOUNTER — OFFICE VISIT (OUTPATIENT)
Dept: ORTHOPEDICS | Facility: CLINIC | Age: 45
End: 2025-01-15
Payer: COMMERCIAL

## 2025-01-15 DIAGNOSIS — S56.519A PARTIAL TEAR OF COMMON EXTENSOR TENDON OF ELBOW: Primary | ICD-10-CM

## 2025-01-15 PROCEDURE — 99214 OFFICE O/P EST MOD 30 MIN: CPT | Performed by: FAMILY MEDICINE

## 2025-01-15 PROCEDURE — T1013 SIGN LANG/ORAL INTERPRETER: HCPCS | Mod: U4

## 2025-01-15 NOTE — LETTER
1/15/2025      Radha Sherman  [address]  [facility] [address]      Dear Colleague,    Thank you for referring your patient, Radha Sherman, to the [facility] [place]. Please see a copy of my visit note below.    Extensive discussion about PRP, Tenex, recovery protocols ESTABLISHED PATIENT FOLLOW-UP:  Pain of the Right Elbow     HISTORY OF PRESENT ILLNESS  Ms. Shay Sherman is a pleasant 44 year old year old female who presents to clinic today for follow-up of right elbow pain.     Date of injury/onset: 1/25/24  Date last seen: 12/11/2024  Following Therapeutic Plan: MRI review  Pain: No changes  Function: No changes  Interval History: Patient completed Physical Therapy x 10 and is reviewing MRI today. Continued difficulty with lateral elbow pain since fall while at work on 1/25/24.  Medial epicondyle has improved at right elbow however right lateral elbow pain persists on a daily basis.    Still feels comfortable with 10# restrictions. Work has been accommodating.     Patient declined  and wished to utilize her son in room, Dex, for assistance.     Additional medical/Social/Surgical histories reviewed in [facility] and updated as appropriate.    REVIEW OF SYSTEMS (1/14/2025)  CONSTITUTIONAL: Denies fever and weight loss  GASTROINTESTINAL: Denies abdominal pain, nausea, vomiting  MUSCULOSKELETAL: See HPI  SKIN: Denies any recent rash or lesion  NEUROLOGICAL: Denies numbness or focal weakness     PHYSICAL EXAM  There were no vitals taken for this visit.    General  - normal appearance, in no obvious distress  Musculoskeletal - right elbow  - inspection: normal joint alignment, no obvious deformity, no soft tissue swelling laterally  - palpation: tender at the origin of the common extensor tendon into extensor musculature.  - ROM:  160 deg flexion              0 deg extension              90 deg pronation              90 deg supination  - strength: 5/5 wrist  extension with elbow flexed, 4/5 with elbow extended, minimally painful resisted extension of long finger with elbow flexed, worse with extension, 5/5  strength  - special tests:  (-) varus  (-) valgus  Neuro  - no sensory or motor deficit, grossly normal coordination, normal muscle tone        IMAGING :   ELBOW RIGHT 2 VIEWS   4/23/2024 1:35 PM      HISTORY: Chronic pain of right elbow.     COMPARISON: None.                                                                      IMPRESSION: Normal. No fracture, effusion or calcified intra-articular  body.     MARISELA SCHAFER MD     MRI of right elbow without  contrast 1/2/2025 8:25 AM     History: Chronic pain of right elbow; Chronic pain of right elbow     Techniques: Multiplanar multisequence imaging through the right elbow  were obtained without administration of intravenous gadolinium  contrast.     Comparison: Elbow radiograph 4/23/2024     Findings:  External marker placed over the anterior aspect of the elbow at the  level of radiocapitellar joint.  Medial compartment  Ulnar collateral ligament: Intact     Common flexor tendon: Very low-grade tearing of the common flexor  tendon at the humeral attachment.     Medial epicondyle: No edema.     Lateral compartment  Radial collateral ligament: Intact     Lateral ulnar collateral ligament: Intact.     Radial annular ligament: Intact.     Common extensor tendon: Tendinosis with moderate grade tearing of the  common extensor tendon at the humeral attachment.     Lateral epicondyle: No edema.     Posterior compartment  Triceps: Intact without tendinosis.     Olecranon: No edema.     Bursitis: No significant fluid.     Anterior compartment  Biceps: Intact.     Brachialis: Mild tendinopathy of the distal Achilles tendon at and  just proximal to its attachment site.     Bicipitoradial bursa: No significant fluid.     Articulations  No evidence of erosion. No significant effusion.     Bones (other than subarticular  marrow): No evidence of fracture,  marrow contusion or marrow infiltrative change.     Others:     Nerves: Short segment T2 hyperintensity of the ulnar nerve proximal to  the cubital tunnel.                                                                            Impression:  1. Tendinosis with moderate grade tearing of the common extensor  tendon at the humeral attachment.  2. Very low-grade tearing of the common flexor tendon at the humeral  attachment.  3. Low-grade tendinosis of the brachialis tendon at its distal  attachment site.     I have personally reviewed the examination and initial interpretation  and I agree with the findings.     JUTTA ELLERMANN, MD      ASSESSMENT & PLAN  Ms. Shay Sherman is a 44 year old year old female who presents to clinic today for follow-up regarding right elbow pain that is persistent since work-related injury on 1/25/2024.    Most recent MRI performed on 1/2/2025 reveals moderate grade tearing of the common extensor tendon as well as tendinosis of region.  Although tendinosis is unlikely to be relating to her fall, I certainly suspect her moderate grade tearing of the common extensor tendon as well as very low-grade tearing of the common flexor tendon is likely secondary to her fall.  She had no pain prior to this fall and therefore I believe we can attribute this partial-thickness tear to her injury.      Treatment options discussed in detail ranging from ongoing conservative cares including bracing, therapy I also discussed more interventional options such as PRP injection, Tenex percutaneous tenotomy procedures.  Lastly we discussed corticosteroid injection as an option.Given the moderate grade tear of the common extensor tendon and her interest in pursuing treatments that will potentially heal this region, I have recommended PRP we will work with her Workmen's Comp rep for approval.  I will coordinate with my staff.    She does have a guideline post procedure that would  include an up to 8-week rehab protocol including occupational therapy, brief sling immobilization as well as bracing.  She understands this.  We also discussed work modifications will be necessary including 1 week away from work followed by limitations of 5 pounds, incrementally increasing towards full return to duty.  My team will reach out to her for next steps in approval of PRP through Workmen's Comp.    All questions answered.  Accompanied by her son Dex crespo.    34 minutes on date of the encounter doing chart review, history and examination, independent imaging review, documentation, and additional activitie extensive discussion about PRP, Tenex, recovery protocols.    It was a pleasure seeing Radha.    Michael Holguin DO, [facility]  Primary Care Sports Medicine      Again, thank you for allowing me to participate in the care of your patient.        Sincerely,        Michael Holguin DO    Electronically signed

## 2025-01-15 NOTE — PATIENT INSTRUCTIONS
ELBOW PRP INJECTION CLINICAL PRACTICE   GUIDELINES     Background  Platelet Rich Plasma (PRP) is an injection of your own blood that has been spun down to increase the   concentration of platelets. This concentrated blood has an increase ingrowth factors, proteins, cytokines and other bioactive molecules that initiate and regulate the basic aspects of wound healing. The goal is to induce an inflammatory process into the diseased tendon to promote proper and long term healing. Although post-procedure care will be tailored to fit your individual needs, the following guidelines are designed to help you and your physical therapist after the procedure.      Your physician may also amend or adjust these   treatments as they deem necessary.      Things to Avoid Before and After Your Procedure  Over-thecounter pain medicine    Ibuprofen (Advil , Motrin ) and naproxen (Aleve , Naprosyn ) can impair your ability to heal and may increase risk of bleeding. Make every effort to avoid these medications for two weeks before and one week after your procedure.    Acetaminophen (Tylenol ) is ok to take for pre and post procedural pain.    If you are taking aspirin (ASA) for cardiovascular benefit, please continue with the same dosage.    There should be no need for narcotic pain medication.   Tobacco & nicotine   Consider talking to your physician or health care provider about stopping. These products impair your ability to heal and might reduce the beneficial effects of the procedure.      Diet    You do not need to fast prior to PRP. You may eat normal meals before PRP.   You may resume your regular diet when you feel able after the procedure  Make sure your medical team provides you with the following before or at your procedure:  1. A sling  2. Therapy appointment times  3. Follow-up visit times     Post-Operative Information  Sling    It is typically recommended to wear the sling for comfort while awake for the next 2 - 3 days.     You do not need to sleep in the sling.   Do not drive while wearing the sling.      Discomfort    Some pain after your procedure is expected for the first few weeks. Local anesthetic was used and this will begin to wear off about 8 hours after the   procedure. Anticipate an increase in pain at this time and consider taking Acetaminophen (Tylenol) about 6 hours after the procedure to stay ahead of your pain.    Use an ice pack on the painful area for 15 minutes as needed; in the first 2-3 days consider icing 3 times daily.    If you are concerned about your pain, please contact your care team.   Bandage    If a bandage / dressing was applied, remove dressing after 24-48 hours. Replace with simple bandage.   Sterile strip bandages can be removed when they begin peeling off or after 7 days.      Keep procedure area clean and dry for 1 week after the procedure until your doctor has seen you for your wound check.     Bathing    It is OK to bathe 24 hours after the procedure     Follow-Up Appointment   You will be scheduled a follow-up appointment at approximately one month.      When to call your Provider   If you notice increasing redness, warmth, pain, fever, drainage from the wound or other problems that concern you, call  [phone]   during normal clinic hours. Otherwise seek care at your local emergency room.     Post-Operative Elbow Care Timeline     Your Rehabilitation will follow these basic principles:     Phase 1:   Inflammation: 3 - 5 days after procedure, sometimes lasting up to 2 weeks.   Purpose: localize and eliminate damaged tissue so that the body can heal.  Response: Increase in blood flow, permeability of blood vessels, migration of fluid proteins and white blood   cells.      Phase 2:   Proliferation: 1-4 weeks after procedure, sometimes lasting up to 8 weeks.   Purpose: PDGF recruit fibroblasts, synthesize collagen to begin to repair tissue.  Response: Jimmie Law: soft tissue heals according to  the manner in which they are being stressed. Rest is contraindicated in this phase.     Phase 3: Remodelin -3 months after procedure.  Purpose: Remodeling, strengthening, improve cellular organization  Response: increased organization of collagen. Tissue and scar maturation.     Please understand that these treatments are not  quick fixes  like cortisone injections but rather we are trying to cause long term healing of the tendon. Anticipate that it may take up to 3 months to experience the improvements in your symptoms.      Day of your procedure   Plan to have a family member or friend drive you home after your procedure   A sling will be given to you at your appointment   Activity restrictions: Rest today   Protect your elbow by resting and keeping it elevated to reduce swelling      Days 2-3   Keep arm in sling   Keep compression wrap on. It should be snug, but not tight    Come out of sling three times per day for gentle range of motion exercises   You should not sleep in the sling   Passive range of motion elbow and wrist flexion and extension     Days 4-7    Discontinue sling   Activity restrictions: You may lift up to 5 lbs. Begin use of elbow and hand for   activities of daily living (like using it to groom, dress, eat and drive short   distances). No sustained gripping like opening a jar.    Rehab: Continue elbow and wrist range of motion exercises and perform 3-5   times/day   AROM elbow and wrist flexion and extension     Progression 1: (weeks 1-2)   Activity Restrictions: You may lift up to 10 pounds   Continue range of motion exercises   Perform isometric wrist and elbow strengthening 1-2 times per day   Avoid sustained gripping     Progression 2:(weeks 2 - 4)   Activity Restrictions: progress as tolerated   Continue range of motion exercises    Continue wrist and elbow isometric strengthening 1-2 times per day   Isotonic Shoulder strengthening activities   Add forearm pronation and supination  AROM   Maintain wrist in neutral position     Criteria to Progress to Progression 3 --Full AROM     Progression 3:(weeks 4 - 6)   Increase intensity of strengthening under the supervision of your care team    Begin concentric/eccentric wrist and elbow extension   Begin to incorporate joint integrated strengthening like chest press, rows and hammer curls   Maintain a neutral wrist position during strength activities     Criteria to Progress to Progression 4 --No reactive pain > 24 hours     Progression 4:(weeks 6 - 8)   Continue to increase intensity of strengthening exercise   Progress joint integrated movements   Begin sport/activity specific activity under the supervision of your care team      Criteria to Progress to Progression 5 --No reactive pain >24 hours     Progression 5: (weeks 8 - 10)   You may resume high impact sports like golf and tennis under the supervision of your care team     Criteria to Progress to Unrestricted Activity   Pain free ROM   5/5 MMT   No reactive pain   Good dynamic control in multi-plane activities   Physician approval     Authors: Hollis Garcia MD, Salomon Rojo PT, Carmenza Powell PT  Reviewers: Rosana Savage PT, Oma Simpson PT  Completion date: May 2020  [facility]

## 2025-01-20 PROBLEM — M25.521 RIGHT ELBOW PAIN: Status: RESOLVED | Noted: 2024-04-29 | Resolved: 2025-01-20

## 2025-02-20 ENCOUNTER — TELEPHONE (OUTPATIENT)
Dept: ORTHOPEDICS | Facility: CLINIC | Age: 45
End: 2025-02-20

## 2025-02-20 NOTE — TELEPHONE ENCOUNTER
Patient request to see if any care team can speak Slovak. Currently, they are unavailable.   Patient gave verbal over the phone to speak with Ancelmo [son, legal age and no C2C on file].     Please have form fax to:     [employer]    ATTN: Vanita     Phone #: [phone] [direct]    Fax #: [phone]

## 2025-02-20 NOTE — TELEPHONE ENCOUNTER
Forms/Letter Request    Type of form/letter: OTHER: Workers Comp provider report       Do we have the form/letter: Yes:     Who is the form from? Insurance comp    Where did/will the form come from? mail    When is form/letter needed by: ASAP    How would you like the form/letter returned: unsureN/A    Patient Notified form requests are processed in 5-7 business days:No    Okay to leave a detailed message?: Yes at Cell number on file:    Telephone Information:   Mobile [phone]

## 2025-02-24 NOTE — TELEPHONE ENCOUNTER
Dr. Holguin,    I believe it's regarding to form that was mail in the clinic and was put in your bin. Reviewing the form, it relating to patient Workers' Compensation.     Form: Health Care Provider Report.     The form will be in the black bin in PURPLE pod.     Thank you!     And altagracia Lugo to contact the  line. The phone # will be [phone]. Let me know if there is anything I can help.     Deborah

## 2025-02-25 ENCOUNTER — APPOINTMENT (OUTPATIENT)
Dept: INTERPRETER SERVICES | Facility: CLINIC | Age: 45
End: 2025-02-25
Payer: COMMERCIAL

## 2025-02-25 ENCOUNTER — TELEPHONE (OUTPATIENT)
Dept: ORTHOPEDICS | Facility: CLINIC | Age: 45
End: 2025-02-25
Payer: COMMERCIAL

## 2025-02-25 NOTE — TELEPHONE ENCOUNTER
Called patient and left a voicemail to inform about situation regarding workers compensation. She was informed of Tenex procedure and told she could discuss this procedure in person or virtually next week once Dr. Holguin returns from vacation.     Parish Wiggins ATC

## 2025-03-12 ENCOUNTER — VIRTUAL VISIT (OUTPATIENT)
Dept: ORTHOPEDICS | Facility: CLINIC | Age: 45
End: 2025-03-12
Payer: COMMERCIAL

## 2025-03-12 DIAGNOSIS — M25.521 CHRONIC PAIN OF RIGHT ELBOW: ICD-10-CM

## 2025-03-12 DIAGNOSIS — S56.519A PARTIAL TEAR OF COMMON EXTENSOR TENDON OF ELBOW: Primary | ICD-10-CM

## 2025-03-12 DIAGNOSIS — G89.29 CHRONIC PAIN OF RIGHT ELBOW: ICD-10-CM

## 2025-03-12 PROCEDURE — 98013 SYNCH AUDIO-ONLY EST LOW 20: CPT | Performed by: FAMILY MEDICINE

## 2025-03-12 NOTE — PROGRESS NOTES
ESTABLISHED PATIENT FOLLOW-UP VIRTUAL TELEPHONE:  No chief complaint on file.     This encounter was conducted via telephone.     Originating Location: On-Site  Patient Location: HomeOn-site  Total telephone time: 25 minutes  Bulgarian interpretor utilized    HISTORY OF PRESENT ILLNESS    Ms. Shay Sherman is a pleasant 44 year old year old female who presents via telephone today for follow-up of right elbow.     Date of injury: 1/25/24  Date last seen: 1/15/2025  Following Therapeutic Plan: Physical Therapy  Pain: Unchanged  Function: Unchanged  Interval History: Treatment options discussed now that PRP injection procedure was not approved by her work comp.  She states that she is still in pain and would like to pursue some other procedure option.  She continues to have difficulty with using spray bottles.    Additional medical/Social/Surgical histories reviewed in [facility] and updated as appropriate.    REVIEW OF SYSTEMS (3/12/2025)  CONSTITUTIONAL: Denies fever and weight loss  GASTROINTESTINAL: Denies abdominal pain, nausea, vomiting  MUSCULOSKELETAL: See HPI  SKIN: Denies any recent rash or lesion  NEUROLOGICAL: Denies numbness or focal weakness    IMAGING :   MRI of right elbow without  contrast 1/2/2025 8:25 AM     History: Chronic pain of right elbow; Chronic pain of right elbow     Techniques: Multiplanar multisequence imaging through the right elbow  were obtained without administration of intravenous gadolinium  contrast.     Comparison: Elbow radiograph 4/23/2024     Findings:  External marker placed over the anterior aspect of the elbow at the  level of radiocapitellar joint.  Medial compartment  Ulnar collateral ligament: Intact     Common flexor tendon: Very low-grade tearing of the common flexor  tendon at the humeral attachment.     Medial epicondyle: No edema.     Lateral compartment  Radial collateral ligament: Intact     Lateral ulnar collateral ligament: Intact.     Radial annular ligament: Intact.      Common extensor tendon: Tendinosis with moderate grade tearing of the  common extensor tendon at the humeral attachment.     Lateral epicondyle: No edema.     Posterior compartment  Triceps: Intact without tendinosis.     Olecranon: No edema.     Bursitis: No significant fluid.     Anterior compartment  Biceps: Intact.     Brachialis: Mild tendinopathy of the distal Achilles tendon at and  just proximal to its attachment site.     Bicipitoradial bursa: No significant fluid.     Articulations  No evidence of erosion. No significant effusion.     Bones (other than subarticular marrow): No evidence of fracture,  marrow contusion or marrow infiltrative change.     Others:     Nerves: Short segment T2 hyperintensity of the ulnar nerve proximal to  the cubital tunnel.                                                                            Impression:  1. Tendinosis with moderate grade tearing of the common extensor  tendon at the humeral attachment.  2. Very low-grade tearing of the common flexor tendon at the humeral  attachment.  3. Low-grade tendinosis of the brachialis tendon at its distal  attachment site.     I have personally reviewed the examination and initial interpretation  and I agree with the findings.     JUTTA ELLERMANN, MD      ASSESSMENT & PLAN  Ms. Shay Sherman is a 44 year old year old female who presents via telephone today for ongoing right elbow pain relating to common extensor tear related to a fall at work on 1/25/24.    MRI confirms tendinosis which I anticipate was pre-existing as well as tear related to fall at work.    Diagnosis:  Chronic pain of right elbow  Partial-thickness tear of extensor tendon of right elbow  Tendinosis of right elbow we did detailed discussion about treatment options.  It is unfortunate that her work comp did not cover PRP injection as I do deem it to be the most appropriate for current pathology.  In addition to this we discussed consideration for percutaneous  tenotomy procedure, branded as Tenex.  We discussed that this is a minimally invasive procedure that we could perform in an outpatient surgery center as well as ongoing rehabilitation after this procedure.  In addition to this we did discuss possibility of surgical consultation with one of our hand surgical members, which often could include discussion of ECRB debridement of right elbow.  I discussed that I do not have full details and recovery on this procedure, or whether it is recommended for her at this time.  She would like to gain more information about this secondary option with a hand surgeon and then weigh her decision determining whether she should consider percutaneous tenotomy versus extensor tendon debridement.    Treatment options then focused around ongoing minimization of elbow pain, decreasing use of spray bottles as able.  Additionally we discussed work comp case and that we would reach out to Vanita, phone number [phone], fax [phone] to make sure we are all in coordination as patient does seem there is a misunderstanding with work comp.    It was a pleasure seeing Radha.    Total Telephone Duration: 25 min    Michael Holguin DO, CAQSM  Primary Care Sports Medicine  Department of Orthopedic Surgery  [facility]

## 2025-03-13 ENCOUNTER — PATIENT OUTREACH (OUTPATIENT)
Dept: CARE COORDINATION | Facility: CLINIC | Age: 45
End: 2025-03-13
Payer: COMMERCIAL

## 2025-03-13 NOTE — TELEPHONE ENCOUNTER
DIAGNOSIS: Partial tear of common extensor tendon of elbow   Chronic pain of right elbow   surgical debridment consultation    APPOINTMENT DATE: 3/24/25   NOTES STATUS DETAILS   OFFICE NOTE from referring provider Internal 3/12/25 - Michael Holguin DO - [facility]      OFFICE NOTE from other specialist Internal 12/4/24 - Beulah Terrell OT - [facility]     4/23/24 - Gold Bush MD - [facility]    MEDICATION LIST Internal    LABS     MRI Internal MR Elbow R - 1/2/25   XRAYS (IMAGES & REPORTS) Internal XR Elbow R - 4/23/24

## 2025-03-17 ENCOUNTER — PATIENT OUTREACH (OUTPATIENT)
Dept: CARE COORDINATION | Facility: CLINIC | Age: 45
End: 2025-03-17
Payer: COMMERCIAL

## 2025-03-18 ENCOUNTER — TELEPHONE (OUTPATIENT)
Dept: ORTHOPEDICS | Facility: CLINIC | Age: 45
End: 2025-03-18
Payer: COMMERCIAL

## 2025-03-18 NOTE — TELEPHONE ENCOUNTER
Telephone encounter: 15:01 3/18/25    Telephone call to Vanita, patient's work comp representative Vanita MARTINEZ.      I left a voicemail today detailing my interested in ensuring our mutual patient has appropriate documentation sent to their office, that indeed her procedure was not covered through Worker's Comp., and did not leave any PHI in the voicemail today.  I instructed her on following up and calling us at [phone] so that we can discuss more clearly what the expectations are for patient and how we can best assist patient in this claim.    Michael Holguin DO [facility]    Primary Care Sports Medicine  [facility]

## 2025-03-24 ENCOUNTER — OFFICE VISIT (OUTPATIENT)
Dept: ORTHOPEDICS | Facility: CLINIC | Age: 45
End: 2025-03-24
Attending: FAMILY MEDICINE
Payer: COMMERCIAL

## 2025-03-24 ENCOUNTER — PRE VISIT (OUTPATIENT)
Dept: ORTHOPEDICS | Facility: CLINIC | Age: 45
End: 2025-03-24

## 2025-03-24 DIAGNOSIS — M25.521 CHRONIC PAIN OF RIGHT ELBOW: ICD-10-CM

## 2025-03-24 DIAGNOSIS — S56.519A PARTIAL TEAR OF COMMON EXTENSOR TENDON OF ELBOW: ICD-10-CM

## 2025-03-24 DIAGNOSIS — G89.29 CHRONIC PAIN OF RIGHT ELBOW: ICD-10-CM

## 2025-03-24 PROCEDURE — 99215 OFFICE O/P EST HI 40 MIN: CPT | Mod: GC | Performed by: STUDENT IN AN ORGANIZED HEALTH CARE EDUCATION/TRAINING PROGRAM

## 2025-03-24 PROCEDURE — 1125F AMNT PAIN NOTED PAIN PRSNT: CPT | Performed by: STUDENT IN AN ORGANIZED HEALTH CARE EDUCATION/TRAINING PROGRAM

## 2025-03-24 NOTE — PROGRESS NOTES
Orthopaedic Surgery Hand and Upper Extremity Clinic H&P Note:  Date: Mar 24, 2025     Patient Name: Radha Sherman  MRN: [id number]    Consult requested by: Michael Holguin    CHIEF COMPLAINT: Right elbow pain    Dominant Hand:Right  Occupation: Cleaning    Patient seen with a     HPI:  Ms. Radha Sherman is a 44 year old  female right hand dominant who presents with right elbow pain greater than 1 year.  She reports that she had a fall at work a little over a year ago and did not think much of it at the time but has since had ongoing elbow pain.  She has been seeing Dr. Holguin for this.  She has been overall steadily improving, but is still having significant symptoms when she tries to lift heavy objects which has required her to modify her work.  This is a Worker's Compensation case.  She has seen Dr. Holguin again discussed Tenex as well as injections but wanted to speak with Dr. Mendoza about the possibility of a surgery to try to treat her.  She does have a diagnosis of lateral epicondylitis.  She reports that a previously her pain was all over the outside of her arm but at this point is fairly isolated to one specific point on the elbow.  She does also have a little bit of medial sided discomfort.  She denies any numbness or tingling in the fingers.    PAST MEDICAL HISTORY:  Past Medical History:   Diagnosis Date    NO ACTIVE PROBLEMS        PAST SURGICAL HISTORY:  Past Surgical History:   Procedure Laterality Date    NO HISTORY OF SURGERY         MEDICATIONS:  Current Outpatient Medications   Medication Sig Dispense Refill    diclofenac (VOLTAREN) 1 % topical gel Apply 2 g topically 4 times daily 100 g 1    diclofenac (VOLTAREN) 75 MG EC tablet Take 1 tablet (75 mg) by mouth 2 times daily. 20 tablet 0    paragard intrauterine copper device 1 each by Intrauterine route once.       No current facility-administered medications for this visit.       ALLERGIES:     Allergies   Allergen  Reactions    Naprosyn [Naproxen] Hives     Patient was taking naprosyn and robaxin together, unsure which one cause the allergic reaction    Robaxin [Methocarbamol] Hives     Patient was taking naprosyn and robaxin together, unsure which one cause the allergic reaction    Latex Rash       FAMILY HISTORY:  No pertinent family history    SOCIAL HISTORY:  Social History     Tobacco Use    Smoking status: Never    Smokeless tobacco: Never   Vaping Use    Vaping status: Never Used   Substance Use Topics    Alcohol use: Not Currently       The patient's past medical, family, and social history was reviewed and confirmed.    REVIEW OF SYMPTOMS:      General: Negative   Eyes: Negative   Ear, Nose and Throat: Negative   Respiratory: Negative   Cardiovascular: Negative   Gastrointestinal: Negative   Genito-urinary: Negative   Musculoskeletal: see HPI  Neurological: Negative   Psychological: Negative  HEME: Negative   ENDO: Negative   SKIN: Negative    VITALS:  There were no vitals filed for this visit.    EXAM:  General: NAD, A&Ox3  HEENT: NC/AT  CV: RRR by peripheral pulse  Pulmonary: Non-labored breathing on RA  Tender to palpation in the right lateral epicondyle.  Negative compression over the radial tunnel.  Pain reproduced with resisted wrist extension and resisted finger extension.  No pain with resisted wrist flexion.  Minimal tenderness over the medial epicondyle.  No numbness or tingling in the hand sensation is comparable to the contralateral side.  No tenderness or instability noted at the ulnar nerve.    LABS:  None    IMAGING:  Right elbow MRI obtained 1/2/2025 was reviewed as part of this visit and demonstrates moderate grade tearing of the common extensor tendon off of the humeral attachment consistent with lateral epicondylitis.  Also very low-grade medial epicondylitis.    I have personally reviewed the above images and labs.         IMPRESSION AND RECOMMENDATIONS:  Ms. Radha Sherman is a 44 year old  female right hand dominant with lateral epicondylitis    I personally reviewed external notes from Dr. Holguin and independently reviewed the above images.    We discussed the patient's diagnosis and treatment options in detail today. This included a description of the associated pathology and non-operative/operative treatment options with the use of illustrations and diagrams.    We had a shared decision-making discussion about potential treatment options.  We discussed continued conservative management.  We discussed that tennis elbow generally resolves on its own without any treatment if given enough time.  We discussed interventions that could be for performed by Dr. Holguin including injections or Tenex.  We also discussed surgical debridement.  It is our recommendation that she proceed with more conservative measures to begin with which would include injection or Tenex.  If these both fail then she could be referred back to us for consideration of surgical debridement.  She was understanding of this.  All of her questions were answered.    Follow-up as needed.    Patient seen and discussed with Dr. Reji Mcnally MD  Orthopaedic Surgery PGY-5    Jef Mendoza MD    Hand, Upper Extremity & Microvascular Surgery  Department of Orthopaedic Surgery  [facility]

## 2025-03-24 NOTE — LETTER
3/24/2025      Radha Sherman  [address]  [place] [address]      Dear Colleague,    Thank you for referring your patient, Radha Sherman, to the [facility] [place]. Please see a copy of my visit note below.    Orthopaedic Surgery Hand and Upper Extremity Clinic H&P Note:  Date: Mar 24, 2025     Patient Name: Radha Sherman  MRN: [id number]    Consult requested by: Michael Holguin    CHIEF COMPLAINT: Right elbow pain    Dominant Hand:Right  Occupation: Cleaning    Patient seen with a     HPI:  Ms. Radha Sherman is a 44 year old  female right hand dominant who presents with right elbow pain greater than 1 year.  She reports that she had a fall at work a little over a year ago and did not think much of it at the time but has since had ongoing elbow pain.  She has been seeing Dr. Holguin for this.  She has been overall steadily improving, but is still having significant symptoms when she tries to lift heavy objects which has required her to modify her work.  This is a Worker's Compensation case.  She has seen Dr. Holguin again discussed Tenex as well as injections but wanted to speak with Dr. Mendoza about the possibility of a surgery to try to treat her.  She does have a diagnosis of lateral epicondylitis.  She reports that a previously her pain was all over the outside of her arm but at this point is fairly isolated to one specific point on the elbow.  She does also have a little bit of medial sided discomfort.  She denies any numbness or tingling in the fingers.    PAST MEDICAL HISTORY:  Past Medical History:   Diagnosis Date     NO ACTIVE PROBLEMS        PAST SURGICAL HISTORY:  Past Surgical History:   Procedure Laterality Date     NO HISTORY OF SURGERY         MEDICATIONS:  Current Outpatient Medications   Medication Sig Dispense Refill     diclofenac (VOLTAREN) 1 % topical gel Apply 2 g topically 4 times daily 100 g 1     diclofenac (VOLTAREN)  75 MG EC tablet Take 1 tablet (75 mg) by mouth 2 times daily. 20 tablet 0     paragard intrauterine copper device 1 each by Intrauterine route once.       No current facility-administered medications for this visit.       ALLERGIES:     Allergies   Allergen Reactions     Naprosyn [Naproxen] Hives     Patient was taking naprosyn and robaxin together, unsure which one cause the allergic reaction     Robaxin [Methocarbamol] Hives     Patient was taking naprosyn and robaxin together, unsure which one cause the allergic reaction     Latex Rash       FAMILY HISTORY:  No pertinent family history    SOCIAL HISTORY:  Social History     Tobacco Use     Smoking status: Never     Smokeless tobacco: Never   Vaping Use     Vaping status: Never Used   Substance Use Topics     Alcohol use: Not Currently       The patient's past medical, family, and social history was reviewed and confirmed.    REVIEW OF SYMPTOMS:      General: Negative   Eyes: Negative   Ear, Nose and Throat: Negative   Respiratory: Negative   Cardiovascular: Negative   Gastrointestinal: Negative   Genito-urinary: Negative   Musculoskeletal: see HPI  Neurological: Negative   Psychological: Negative  HEME: Negative   ENDO: Negative   SKIN: Negative    VITALS:  There were no vitals filed for this visit.    EXAM:  General: NAD, A&Ox3  HEENT: NC/AT  CV: RRR by peripheral pulse  Pulmonary: Non-labored breathing on RA  Tender to palpation in the right lateral epicondyle.  Negative compression over the radial tunnel.  Pain reproduced with resisted wrist extension and resisted finger extension.  No pain with resisted wrist flexion.  Minimal tenderness over the medial epicondyle.  No numbness or tingling in the hand sensation is comparable to the contralateral side.  No tenderness or instability noted at the ulnar nerve.    LABS:  None    IMAGING:  Right elbow MRI obtained 1/2/2025 was reviewed as part of this visit and demonstrates moderate grade tearing of the common  extensor tendon off of the humeral attachment consistent with lateral epicondylitis.  Also very low-grade medial epicondylitis.    I have personally reviewed the above images and labs.         IMPRESSION AND RECOMMENDATIONS:  Ms. Radha Sherman is a 44 year old female right hand dominant with lateral epicondylitis    I personally reviewed external notes from Dr. Holguin and independently reviewed the above images.    We discussed the patient's diagnosis and treatment options in detail today. This included a description of the associated pathology and non-operative/operative treatment options with the use of illustrations and diagrams.    We had a shared decision-making discussion about potential treatment options.  We discussed continued conservative management.  We discussed that tennis elbow generally resolves on its own without any treatment if given enough time.  We discussed interventions that could be for performed by Dr. Holguin including injections or Tenex.  We also discussed surgical debridement.  It is our recommendation that she proceed with more conservative measures to begin with which would include injection or Tenex.  If these both fail then she could be referred back to us for consideration of surgical debridement.  She was understanding of this.  All of her questions were answered.    Follow-up as needed.    Patient seen and discussed with Dr. Reji Mcnally MD  Orthopaedic Surgery PGY-5    Jef Mednoza MD    Hand, Upper Extremity & Microvascular Surgery  Department of Orthopaedic Surgery  [facility]          Attestation signed by Jef Mendoza MD at 3/24/2025  6:14 PM (Updated):  I saw and evaluated the patient and agree with the findings and plan of care as documented in the note.    Visit was conducted with the help of a Yakut video . I discussed the diagnosis, prognosis, and treatment options with the patient in detail.  I discussed the  protracted recovery timeline associated with lateral and medial epicondylitis which can take 1 to 2 years to improve with intermittent flare-ups.  I advised that occupational therapy is the mainstay of treatment, particularly stretching.  Recommended that she resume this as it has been several months since last visit.  She does state that it made her feel better.  She does have a counterforce brace as well and we discussed appropriate use.  I also recommended consistent use of a right wrist brace with all activities to control and support wrist extension.  The patient states that she is not able to do this given work requirements.  I recommended that the patient return to see Dr. Holguin to discuss Tenex and steroid injections as she may be able to avoid surgery. I advised that steroid injections can provide some short-term symptomatic control but would not alter the long-term prognosis of the condition. I reassured her that despite the pain, she is not causing any lasting damage to the elbow and she will not develop any long-term functional deficits.    If the symptoms remain refractory, we could consider a lateral epicondyle debridement.  Details, risk, benefits as well as recovery course discussed.  All questions appeared the patient was understanding and agreement.  She will follow-up with me as needed.    A total of 45 minutes with Radha Sherman during today's office visit. Over 50% of this time was spent counseling the patient, performing the history and physical exam, and coordinating care. Remainder of time spent on chart review and documentation.    Jef Mendoza MD    Hand, Upper Extremity & Microvascular Surgery  Department of Orthopedic Surgery  [facility]         Again, thank you for allowing me to participate in the care of your patient.        Sincerely,        Jef Mendoza MD    Electronically signed

## 2025-03-24 NOTE — NURSING NOTE
Reason For Visit:   Chief Complaint   Patient presents with    Consult     Right elbow partial tear of common extensor tendon       Primary MD: Michael Holguin  Ref. MD: Ezio    Age: 44 year old    ?  Yes, specify language: Tajik      There were no vitals taken for this visit.      Pain Assessment  Patient Currently in Pain: Yes  0-10 Pain Scale: 4 (at rest.  Can get up to an 8/10 with lifting)  Primary Pain Location: Elbow (Right)  Pain Descriptors: Aching, Constant, Intermittent, Sharp, Stabbing    Hand Dominance Evaluation  Hand Dominance: Right          QuickDASH Assessment       No data to display                   Current Outpatient Medications   Medication Sig Dispense Refill    diclofenac (VOLTAREN) 1 % topical gel Apply 2 g topically 4 times daily 100 g 1    diclofenac (VOLTAREN) 75 MG EC tablet Take 1 tablet (75 mg) by mouth 2 times daily. 20 tablet 0    paragard intrauterine copper device 1 each by Intrauterine route once.         Allergies   Allergen Reactions    Naprosyn [Naproxen] Hives     Patient was taking naprosyn and robaxin together, unsure which one cause the allergic reaction    Robaxin [Methocarbamol] Hives     Patient was taking naprosyn and robaxin together, unsure which one cause the allergic reaction    Latex Rash       OLEG TILLEY, ATC